# Patient Record
Sex: MALE | Race: WHITE | Employment: UNEMPLOYED | URBAN - METROPOLITAN AREA
[De-identification: names, ages, dates, MRNs, and addresses within clinical notes are randomized per-mention and may not be internally consistent; named-entity substitution may affect disease eponyms.]

---

## 2018-01-04 ENCOUNTER — HOSPITAL ENCOUNTER (EMERGENCY)
Age: 33
Discharge: HOME | End: 2018-01-04
Attending: EMERGENCY MEDICINE
Payer: COMMERCIAL

## 2018-01-04 VITALS
HEIGHT: 69 IN | RESPIRATION RATE: 17 BRPM | BODY MASS INDEX: 30.07 KG/M2 | SYSTOLIC BLOOD PRESSURE: 134 MMHG | TEMPERATURE: 97.9 F | WEIGHT: 203 LBS | HEART RATE: 67 BPM | OXYGEN SATURATION: 98 % | DIASTOLIC BLOOD PRESSURE: 90 MMHG

## 2018-01-04 DIAGNOSIS — N23 RENAL COLIC: Primary | ICD-10-CM

## 2018-01-04 LAB
-: ABNORMAL
AMORPHOUS: ABNORMAL
BACTERIA: ABNORMAL
BILIRUBIN URINE: NEGATIVE
CASTS UA: ABNORMAL /LPF
COLOR: YELLOW
COMMENT UA: ABNORMAL
CRYSTALS, UA: ABNORMAL /HPF
EPITHELIAL CELLS UA: ABNORMAL /HPF (ref 0–5)
GLUCOSE URINE: NEGATIVE
KETONES, URINE: NEGATIVE
LEUKOCYTE ESTERASE, URINE: NEGATIVE
MUCUS: ABNORMAL
NITRITE, URINE: NEGATIVE
OTHER OBSERVATIONS UA: ABNORMAL
PH UA: 6.5 (ref 5–8)
PROTEIN UA: ABNORMAL
RBC UA: ABNORMAL /HPF (ref 0–2)
RENAL EPITHELIAL, UA: ABNORMAL /HPF
SPECIFIC GRAVITY UA: 1.02 (ref 1–1.03)
TRICHOMONAS: ABNORMAL
TURBIDITY: ABNORMAL
URINE HGB: ABNORMAL
UROBILINOGEN, URINE: NORMAL
WBC UA: ABNORMAL /HPF (ref 0–5)
YEAST: ABNORMAL

## 2018-01-04 PROCEDURE — 99283 EMERGENCY DEPT VISIT LOW MDM: CPT

## 2018-01-04 PROCEDURE — 2580000003 HC RX 258: Performed by: EMERGENCY MEDICINE

## 2018-01-04 PROCEDURE — 96375 TX/PRO/DX INJ NEW DRUG ADDON: CPT

## 2018-01-04 PROCEDURE — 81001 URINALYSIS AUTO W/SCOPE: CPT

## 2018-01-04 PROCEDURE — 6360000002 HC RX W HCPCS: Performed by: EMERGENCY MEDICINE

## 2018-01-04 PROCEDURE — 96374 THER/PROPH/DIAG INJ IV PUSH: CPT

## 2018-01-04 RX ORDER — MORPHINE SULFATE 4 MG/ML
4 INJECTION, SOLUTION INTRAMUSCULAR; INTRAVENOUS ONCE
Status: COMPLETED | OUTPATIENT
Start: 2018-01-04 | End: 2018-01-04

## 2018-01-04 RX ORDER — ONDANSETRON 2 MG/ML
4 INJECTION INTRAMUSCULAR; INTRAVENOUS ONCE
Status: COMPLETED | OUTPATIENT
Start: 2018-01-04 | End: 2018-01-04

## 2018-01-04 RX ORDER — CITALOPRAM 10 MG/1
10 TABLET ORAL DAILY
COMMUNITY
Start: 2017-07-15

## 2018-01-04 RX ORDER — ALPRAZOLAM 0.25 MG/1
0.25 TABLET ORAL
COMMUNITY

## 2018-01-04 RX ORDER — GABAPENTIN 300 MG/1
CAPSULE ORAL
COMMUNITY
End: 2018-06-16

## 2018-01-04 RX ORDER — KETOROLAC TROMETHAMINE 15 MG/ML
15 INJECTION, SOLUTION INTRAMUSCULAR; INTRAVENOUS ONCE
Status: COMPLETED | OUTPATIENT
Start: 2018-01-04 | End: 2018-01-04

## 2018-01-04 RX ORDER — TOPIRAMATE 25 MG/1
TABLET ORAL
Status: ON HOLD | COMMUNITY
Start: 2017-09-13 | End: 2020-02-18

## 2018-01-04 RX ORDER — CYCLOBENZAPRINE HCL 10 MG
10 TABLET ORAL
Status: ON HOLD | COMMUNITY
End: 2020-02-18

## 2018-01-04 RX ORDER — OXYCODONE HYDROCHLORIDE AND ACETAMINOPHEN 5; 325 MG/1; MG/1
1 TABLET ORAL EVERY 6 HOURS PRN
Qty: 20 TABLET | Refills: 0 | Status: SHIPPED | OUTPATIENT
Start: 2018-01-04 | End: 2018-01-09

## 2018-01-04 RX ORDER — PRAVASTATIN SODIUM 40 MG
40 TABLET ORAL
COMMUNITY
Start: 2017-07-17

## 2018-01-04 RX ORDER — 0.9 % SODIUM CHLORIDE 0.9 %
500 INTRAVENOUS SOLUTION INTRAVENOUS ONCE
Status: COMPLETED | OUTPATIENT
Start: 2018-01-04 | End: 2018-01-04

## 2018-01-04 RX ADMIN — SODIUM CHLORIDE 500 ML: 9 INJECTION, SOLUTION INTRAVENOUS at 22:23

## 2018-01-04 RX ADMIN — KETOROLAC TROMETHAMINE 15 MG: 15 INJECTION, SOLUTION INTRAMUSCULAR; INTRAVENOUS at 22:23

## 2018-01-04 RX ADMIN — ONDANSETRON 4 MG: 2 INJECTION, SOLUTION INTRAMUSCULAR; INTRAVENOUS at 22:23

## 2018-01-04 RX ADMIN — MORPHINE SULFATE 4 MG: 4 INJECTION INTRAVENOUS at 22:26

## 2018-01-04 ASSESSMENT — PAIN DESCRIPTION - PAIN TYPE: TYPE: ACUTE PAIN

## 2018-01-04 ASSESSMENT — PAIN DESCRIPTION - LOCATION: LOCATION: BACK

## 2018-01-04 ASSESSMENT — PAIN SCALES - GENERAL
PAINLEVEL_OUTOF10: 2
PAINLEVEL_OUTOF10: 2
PAINLEVEL_OUTOF10: 1
PAINLEVEL_OUTOF10: 10

## 2018-01-04 ASSESSMENT — PAIN DESCRIPTION - FREQUENCY: FREQUENCY: INTERMITTENT

## 2018-01-04 ASSESSMENT — PAIN DESCRIPTION - DESCRIPTORS: DESCRIPTORS: SHARP

## 2018-01-04 ASSESSMENT — PAIN DESCRIPTION - ORIENTATION: ORIENTATION: RIGHT

## 2018-01-05 NOTE — ED NOTES
Pt cleared for discharge per MD. Pt discharge instructions explained. Prescription provided. Pt verbalizes understanding of all instructions and all patient questions answered to their satisfaction. Pt departs from ER ambulatory and in stable condition. Father driving pt home.      Isacc High RN  01/04/18 8843

## 2018-06-16 ENCOUNTER — HOSPITAL ENCOUNTER (EMERGENCY)
Age: 33
Discharge: HOME | End: 2018-06-16
Attending: EMERGENCY MEDICINE
Payer: COMMERCIAL

## 2018-06-16 VITALS
HEART RATE: 69 BPM | SYSTOLIC BLOOD PRESSURE: 128 MMHG | DIASTOLIC BLOOD PRESSURE: 79 MMHG | OXYGEN SATURATION: 97 % | TEMPERATURE: 98.8 F | RESPIRATION RATE: 16 BRPM

## 2018-06-16 DIAGNOSIS — R11.2 NAUSEA VOMITING AND DIARRHEA: Primary | ICD-10-CM

## 2018-06-16 DIAGNOSIS — R19.7 NAUSEA VOMITING AND DIARRHEA: Primary | ICD-10-CM

## 2018-06-16 LAB
-: ABNORMAL
ABSOLUTE EOS #: 0.1 K/UL (ref 0–0.4)
ABSOLUTE IMMATURE GRANULOCYTE: ABNORMAL K/UL (ref 0–0.3)
ABSOLUTE LYMPH #: 0.5 K/UL (ref 1–4.8)
ABSOLUTE MONO #: 0.5 K/UL (ref 0.1–1.2)
ALBUMIN SERPL-MCNC: 4.7 G/DL (ref 3.5–5.2)
ALBUMIN/GLOBULIN RATIO: 1.6 (ref 1–2.5)
ALP BLD-CCNC: 64 U/L (ref 40–129)
ALT SERPL-CCNC: 25 U/L (ref 5–41)
AMORPHOUS: ABNORMAL
AMYLASE: 60 U/L (ref 28–100)
ANION GAP SERPL CALCULATED.3IONS-SCNC: 16 MMOL/L (ref 9–17)
AST SERPL-CCNC: 21 U/L
BACTERIA: ABNORMAL
BASOPHILS # BLD: 0 % (ref 0–2)
BASOPHILS ABSOLUTE: 0 K/UL (ref 0–0.2)
BILIRUB SERPL-MCNC: 0.6 MG/DL (ref 0.3–1.2)
BILIRUBIN DIRECT: 0.15 MG/DL
BILIRUBIN URINE: NEGATIVE
BILIRUBIN, INDIRECT: 0.45 MG/DL (ref 0–1)
BUN BLDV-MCNC: 17 MG/DL (ref 6–20)
BUN/CREAT BLD: ABNORMAL (ref 9–20)
CALCIUM SERPL-MCNC: 9.6 MG/DL (ref 8.6–10.4)
CASTS UA: ABNORMAL /LPF
CHLORIDE BLD-SCNC: 103 MMOL/L (ref 98–107)
CO2: 22 MMOL/L (ref 20–31)
COLOR: YELLOW
COMMENT UA: ABNORMAL
CREAT SERPL-MCNC: 1.4 MG/DL (ref 0.7–1.2)
CRYSTALS, UA: ABNORMAL /HPF
DIFFERENTIAL TYPE: ABNORMAL
EOSINOPHILS RELATIVE PERCENT: 2 % (ref 1–4)
EPITHELIAL CELLS UA: ABNORMAL /HPF (ref 0–5)
GFR AFRICAN AMERICAN: >60 ML/MIN
GFR NON-AFRICAN AMERICAN: 58 ML/MIN
GFR SERPL CREATININE-BSD FRML MDRD: ABNORMAL ML/MIN/{1.73_M2}
GFR SERPL CREATININE-BSD FRML MDRD: ABNORMAL ML/MIN/{1.73_M2}
GLOBULIN: NORMAL G/DL (ref 1.5–3.8)
GLUCOSE BLD-MCNC: 97 MG/DL (ref 70–99)
GLUCOSE URINE: NEGATIVE
HCT VFR BLD CALC: 42.9 % (ref 41–53)
HEMOGLOBIN: 14.9 G/DL (ref 13.5–17.5)
IMMATURE GRANULOCYTES: ABNORMAL %
KETONES, URINE: NEGATIVE
LEUKOCYTE ESTERASE, URINE: NEGATIVE
LIPASE: 36 U/L (ref 13–60)
LYMPHOCYTES # BLD: 7 % (ref 24–44)
MCH RBC QN AUTO: 31.1 PG (ref 26–34)
MCHC RBC AUTO-ENTMCNC: 34.7 G/DL (ref 31–37)
MCV RBC AUTO: 89.7 FL (ref 80–100)
MONOCYTES # BLD: 6 % (ref 2–11)
MUCUS: ABNORMAL
NITRITE, URINE: NEGATIVE
NRBC AUTOMATED: ABNORMAL PER 100 WBC
OTHER OBSERVATIONS UA: ABNORMAL
PDW BLD-RTO: 12.9 % (ref 12.5–15.4)
PH UA: 6 (ref 5–8)
PLATELET # BLD: 216 K/UL (ref 140–450)
PLATELET ESTIMATE: ABNORMAL
PMV BLD AUTO: 9.5 FL (ref 6–12)
POTASSIUM SERPL-SCNC: 4 MMOL/L (ref 3.7–5.3)
PROTEIN UA: ABNORMAL
RBC # BLD: 4.79 M/UL (ref 4.5–5.9)
RBC # BLD: ABNORMAL 10*6/UL
RBC UA: ABNORMAL /HPF (ref 0–2)
RENAL EPITHELIAL, UA: ABNORMAL /HPF
SEG NEUTROPHILS: 85 % (ref 36–66)
SEGMENTED NEUTROPHILS ABSOLUTE COUNT: 6.4 K/UL (ref 1.8–7.7)
SODIUM BLD-SCNC: 141 MMOL/L (ref 135–144)
SPECIFIC GRAVITY UA: 1.03 (ref 1–1.03)
TOTAL PROTEIN: 7.6 G/DL (ref 6.4–8.3)
TRICHOMONAS: ABNORMAL
TURBIDITY: CLEAR
URINE HGB: NEGATIVE
UROBILINOGEN, URINE: NORMAL
WBC # BLD: 7.5 K/UL (ref 3.5–11)
WBC # BLD: ABNORMAL 10*3/UL
WBC UA: ABNORMAL /HPF (ref 0–5)
YEAST: ABNORMAL

## 2018-06-16 PROCEDURE — 81001 URINALYSIS AUTO W/SCOPE: CPT

## 2018-06-16 PROCEDURE — 83690 ASSAY OF LIPASE: CPT

## 2018-06-16 PROCEDURE — 99284 EMERGENCY DEPT VISIT MOD MDM: CPT

## 2018-06-16 PROCEDURE — 6360000002 HC RX W HCPCS: Performed by: EMERGENCY MEDICINE

## 2018-06-16 PROCEDURE — 82150 ASSAY OF AMYLASE: CPT

## 2018-06-16 PROCEDURE — 85025 COMPLETE CBC W/AUTO DIFF WBC: CPT

## 2018-06-16 PROCEDURE — 80076 HEPATIC FUNCTION PANEL: CPT

## 2018-06-16 PROCEDURE — 6370000000 HC RX 637 (ALT 250 FOR IP): Performed by: EMERGENCY MEDICINE

## 2018-06-16 PROCEDURE — 80048 BASIC METABOLIC PNL TOTAL CA: CPT

## 2018-06-16 PROCEDURE — 36415 COLL VENOUS BLD VENIPUNCTURE: CPT

## 2018-06-16 PROCEDURE — 96374 THER/PROPH/DIAG INJ IV PUSH: CPT

## 2018-06-16 PROCEDURE — 2580000003 HC RX 258: Performed by: EMERGENCY MEDICINE

## 2018-06-16 RX ORDER — ONDANSETRON 4 MG/1
4 TABLET, FILM COATED ORAL EVERY 8 HOURS PRN
Qty: 20 TABLET | Refills: 0 | Status: SHIPPED | OUTPATIENT
Start: 2018-06-16 | End: 2022-04-30 | Stop reason: ALTCHOICE

## 2018-06-16 RX ORDER — DICYCLOMINE HYDROCHLORIDE 10 MG/1
20 CAPSULE ORAL ONCE
Status: COMPLETED | OUTPATIENT
Start: 2018-06-16 | End: 2018-06-16

## 2018-06-16 RX ORDER — 0.9 % SODIUM CHLORIDE 0.9 %
1000 INTRAVENOUS SOLUTION INTRAVENOUS ONCE
Status: COMPLETED | OUTPATIENT
Start: 2018-06-16 | End: 2018-06-16

## 2018-06-16 RX ORDER — ONDANSETRON 2 MG/ML
4 INJECTION INTRAMUSCULAR; INTRAVENOUS ONCE
Status: COMPLETED | OUTPATIENT
Start: 2018-06-16 | End: 2018-06-16

## 2018-06-16 RX ORDER — DICYCLOMINE HYDROCHLORIDE 10 MG/1
10 CAPSULE ORAL EVERY 6 HOURS PRN
Qty: 20 CAPSULE | Refills: 0 | Status: ON HOLD | OUTPATIENT
Start: 2018-06-16 | End: 2020-02-18

## 2018-06-16 RX ADMIN — SODIUM CHLORIDE 1000 ML: 9 INJECTION, SOLUTION INTRAVENOUS at 12:13

## 2018-06-16 RX ADMIN — DICYCLOMINE HYDROCHLORIDE 10 MG: 10 CAPSULE ORAL at 12:16

## 2018-06-16 RX ADMIN — ONDANSETRON 4 MG: 2 INJECTION INTRAMUSCULAR; INTRAVENOUS at 12:15

## 2018-06-16 ASSESSMENT — PAIN DESCRIPTION - ONSET: ONSET: ON-GOING

## 2018-06-16 ASSESSMENT — PAIN DESCRIPTION - LOCATION: LOCATION: ABDOMEN

## 2018-06-16 ASSESSMENT — ENCOUNTER SYMPTOMS
NAUSEA: 1
DIARRHEA: 1
VOMITING: 1
ABDOMINAL PAIN: 1

## 2018-06-16 ASSESSMENT — PAIN DESCRIPTION - FREQUENCY: FREQUENCY: CONTINUOUS

## 2018-06-16 ASSESSMENT — PAIN SCALES - GENERAL: PAINLEVEL_OUTOF10: 5

## 2018-06-16 ASSESSMENT — PAIN DESCRIPTION - PROGRESSION: CLINICAL_PROGRESSION: NOT CHANGED

## 2018-06-16 ASSESSMENT — PAIN DESCRIPTION - DESCRIPTORS: DESCRIPTORS: CRAMPING

## 2018-06-16 ASSESSMENT — PAIN DESCRIPTION - PAIN TYPE: TYPE: ACUTE PAIN

## 2020-02-05 ENCOUNTER — HOSPITAL ENCOUNTER (OUTPATIENT)
Dept: PREADMISSION TESTING | Age: 35
Discharge: HOME | End: 2020-02-09
Payer: MEDICARE

## 2020-02-05 VITALS
BODY MASS INDEX: 28.88 KG/M2 | DIASTOLIC BLOOD PRESSURE: 90 MMHG | SYSTOLIC BLOOD PRESSURE: 139 MMHG | OXYGEN SATURATION: 97 % | RESPIRATION RATE: 16 BRPM | HEIGHT: 69 IN | HEART RATE: 73 BPM | WEIGHT: 195 LBS

## 2020-02-05 LAB
ANION GAP SERPL CALCULATED.3IONS-SCNC: 11 MMOL/L (ref 9–17)
BUN BLDV-MCNC: 16 MG/DL (ref 6–20)
BUN/CREAT BLD: 13 (ref 9–20)
CALCIUM SERPL-MCNC: 9.7 MG/DL (ref 8.6–10.4)
CHLORIDE BLD-SCNC: 103 MMOL/L (ref 98–107)
CO2: 26 MMOL/L (ref 20–31)
CREAT SERPL-MCNC: 1.23 MG/DL (ref 0.7–1.2)
GFR AFRICAN AMERICAN: >60 ML/MIN
GFR NON-AFRICAN AMERICAN: >60 ML/MIN
GFR SERPL CREATININE-BSD FRML MDRD: ABNORMAL ML/MIN/{1.73_M2}
GFR SERPL CREATININE-BSD FRML MDRD: ABNORMAL ML/MIN/{1.73_M2}
GLUCOSE BLD-MCNC: 85 MG/DL (ref 70–99)
HCT VFR BLD CALC: 42.7 % (ref 40.7–50.3)
HEMOGLOBIN: 14.1 G/DL (ref 13–17)
MCH RBC QN AUTO: 30.6 PG (ref 25.2–33.5)
MCHC RBC AUTO-ENTMCNC: 33 G/DL (ref 28–38)
MCV RBC AUTO: 92.6 FL (ref 82.6–102.9)
NRBC AUTOMATED: NORMAL PER 100 WBC
PDW BLD-RTO: 12.7 % (ref 11.8–14.4)
PLATELET # BLD: 268 K/UL (ref 138–453)
PMV BLD AUTO: 10.7 FL (ref 8.1–13.5)
POTASSIUM SERPL-SCNC: 3.9 MMOL/L (ref 3.7–5.3)
RBC # BLD: 4.61 M/UL (ref 4.21–5.77)
SODIUM BLD-SCNC: 140 MMOL/L (ref 135–144)
WBC # BLD: 5.8 K/UL (ref 3.5–11.3)

## 2020-02-05 PROCEDURE — 80048 BASIC METABOLIC PNL TOTAL CA: CPT

## 2020-02-05 PROCEDURE — 85027 COMPLETE CBC AUTOMATED: CPT

## 2020-02-05 PROCEDURE — 93005 ELECTROCARDIOGRAM TRACING: CPT | Performed by: ANESTHESIOLOGY

## 2020-02-05 PROCEDURE — 36415 COLL VENOUS BLD VENIPUNCTURE: CPT

## 2020-02-05 ASSESSMENT — PAIN DESCRIPTION - ORIENTATION: ORIENTATION: LEFT;UPPER

## 2020-02-05 ASSESSMENT — PAIN DESCRIPTION - PAIN TYPE: TYPE: ACUTE PAIN

## 2020-02-05 ASSESSMENT — PAIN DESCRIPTION - FREQUENCY: FREQUENCY: INTERMITTENT

## 2020-02-05 ASSESSMENT — PAIN DESCRIPTION - LOCATION: LOCATION: ARM

## 2020-02-05 ASSESSMENT — PAIN DESCRIPTION - PROGRESSION: CLINICAL_PROGRESSION: GRADUALLY IMPROVING

## 2020-02-05 ASSESSMENT — PAIN SCALES - GENERAL: PAINLEVEL_OUTOF10: 1

## 2020-02-05 ASSESSMENT — PAIN DESCRIPTION - DESCRIPTORS: DESCRIPTORS: DULL

## 2020-02-05 ASSESSMENT — PAIN - FUNCTIONAL ASSESSMENT: PAIN_FUNCTIONAL_ASSESSMENT: PREVENTS OR INTERFERES SOME ACTIVE ACTIVITIES AND ADLS

## 2020-02-05 NOTE — PRE-PROCEDURE INSTRUCTIONS
[facility] ON Tuesday, February 18 at 6:00 AM    Once you enter the hospital lobby, take the elevators to the second floor. Check-In is at the surgery registration desk. If you have been given a blood band, you must bring it with you the day of surgery. Continue to take your home medications as you normally do up to and including the night before surgery with the exception of any blood thinning medications. Please stop any blood thinning medications as directed by your surgeon or prescribing physician. Failure to stop certain medications may interfere with your scheduled surgery. These may include:  Aspirin, Warfarin (Coumadin), Clopidogrel (Plavix), Ibuprofen (Motrin, Advil), Naproxen (Aleve), Meloxicam (Mobic), Celecoxib (Celebrex), Eliquis, Pradaxa, Xarelto, Effient, Fish Oil, Herbal supplements. Stop aleve, fish oil 1 week prior to surgery    If you are diabetic, do not take any of your diabetic medications by mouth the morning of surgery. If you are taking insulin contact the doctor that manages your diabetes for instructions about any changes to your insulin dosages the day before surgery. Do not inject insulin or other injectable diabetic medications the morning of surgery unless otherwise instructed by the doctor who manages your diabetes. Please take the following medication(s) the day of surgery with a small sip of water:  Xanax if needed  Please use your inhalers at home the day of surgery. PREPARING FOR YOUR SURGERY:     Before surgery, you can play an important role in your own health. Because skin is not sterile, we need to be sure that your skin is as free of germs as possible before surgery by carefully washing before surgery. Preparing or prepping skin before surgery can reduce the risk of a surgical site infection.   Do not shave the area of your body where your surgery will be performed unless you received specific permission from your physician.     You will need to shower at home the night before surgery and the morning of surgery with a special soap called chlorhexidine gluconate (CHG*). *Not to be used by people allergic to Chlorhexidine Gluconate (CHG). Following these instructions will help you be sure that your skin is clean before surgery. Instructions on cleaning your skin before surgery: The night before your surgery:      You will need to shower with warm water (not hot) and the CHG soap.  Use a clean wash cloth and a clean towel. Have clean clothes available to put on after the shower.    First wash your hair with regular shampoo. Rinse your hair and body thoroughly to remove the shampoo. Otelia Tiffany Wash your face with your regular soap or water only. Thoroughly rinse your body with warm water from the neck down.  Turn water off to prevent rinsing the soap off too soon.  With a clean wet washcloth and half of the CHG soap in the bottle, lather your entire body from the neck down. Do not use CHG soap near your eyes or ears to avoid injury to those areas.  Wash thoroughly, paying special attention to the area where your surgery will be performed.  Wash your body gently for five (5) minutes. Avoid scrubbing your skin too hard.  Turn the water back on and rinse your body thoroughly.  Pat yourself dry with a clean, soft towel. Do not apply lotion, cream or powder.  Dress with clean freshly washed clothes. The morning of surgery:     Repeat shower following steps above - using remaining half of CHG soap in bottle. Patient Instructions:    Clem Allen If you are having any type of anesthesia you are to have nothing to eat or drink after midnight the night before your surgery. This includes gum, mints, water or smoking or chewing tobacco.  The only exception to this is a small sip of water to take with any morning dose of heart, blood pressure, or seizure medications.   No alcoholic beverages for 24 hours prior

## 2020-02-05 NOTE — H&P (VIEW-ONLY)
hours as needed for Nausea 6/16/18   Amarjit Retana MD   dicyclomine (BENTYL) 10 MG capsule Take 1 capsule by mouth every 6 hours as needed (cramps) 6/16/18   Amarjit Retana MD   cyclobenzaprine (FLEXERIL) 10 MG tablet Take 10 mg by mouth    Historical Provider, MD   ALPRAZolam [place]) 0.25 MG tablet Take 0.25 mg by mouth. Historical Provider, MD   citalopram (CELEXA) 10 MG tablet Take 10 mg by mouth daily  7/15/17   Historical Provider, MD   Omega-3 Fatty Acids (FISH OIL PO)  6/30/17   Historical Provider, MD   pravastatin (PRAVACHOL) 40 MG tablet Take 40 mg by mouth 7/17/17   Historical Provider, MD   topiramate (TOPAMAX) 25 MG tablet Take one daily, increase as directed. 9/13/17   Historical Provider, MD   OMEPRAZOLE PO Take by mouth    Historical Provider, MD   Cyanocobalamin (VITAMIN B 12 PO) Take by mouth    Historical Provider, MD   magnesium 30 MG tablet Take by mouth    Historical Provider, MD        Allergies:     Patient has no known allergies. Social History:     Tobacco:    reports that he has quit smoking. His smoking use included cigarettes. He has a 5.00 pack-year smoking history. He has never used smokeless tobacco.  Alcohol:      reports current alcohol use. Drug Use:  reports current drug use. Drug: Marijuana. Family History:     MOTHER HAD BREAST CANCER ,SURVIVES, DAD HAD SKIN AND THEN COLON CANCER, AND SURVIVES. Review of Systems:     Positive and Negative as described in HPI. CONSTITUTIONAL:  negative for fevers, chills,HAS  sweats, fatigue, weight loss  HEENT:  negative for vision, hearing changes, runny nose, throat pain  RESPIRATORY:  negative for shortness of breath, cough, congestion, wheezing. SLEEP APNEA IS SUSPECTED   CARDIOVASCULAR:  negative for chest pain, palpitations.   GASTROINTESTINAL:  negative for nausea, vomiting, HAS GERD TAKES OMEPRAZOLE NO diarrhea, constipation, change in bowel habits, abdominal pain   GENITOURINARY:  negative for difficulty of

## 2020-02-05 NOTE — H&P
hours as needed for Nausea 6/16/18   Minus MD Brayan   dicyclomine (BENTYL) 10 MG capsule Take 1 capsule by mouth every 6 hours as needed (cramps) 6/16/18   Minus MD Brayan   cyclobenzaprine (FLEXERIL) 10 MG tablet Take 10 mg by mouth    Historical Provider, MD   ALPRAZolam [place]) 0.25 MG tablet Take 0.25 mg by mouth. Historical Provider, MD   citalopram (CELEXA) 10 MG tablet Take 10 mg by mouth daily  7/15/17   Historical Provider, MD   Omega-3 Fatty Acids (FISH OIL PO)  6/30/17   Historical Provider, MD   pravastatin (PRAVACHOL) 40 MG tablet Take 40 mg by mouth 7/17/17   Historical Provider, MD   topiramate (TOPAMAX) 25 MG tablet Take one daily, increase as directed. 9/13/17   Historical Provider, MD   OMEPRAZOLE PO Take by mouth    Historical Provider, MD   Cyanocobalamin (VITAMIN B 12 PO) Take by mouth    Historical Provider, MD   magnesium 30 MG tablet Take by mouth    Historical Provider, MD        Allergies:     Patient has no known allergies. Social History:     Tobacco:    reports that he has quit smoking. His smoking use included cigarettes. He has a 5.00 pack-year smoking history. He has never used smokeless tobacco.  Alcohol:      reports current alcohol use. Drug Use:  reports current drug use. Drug: Marijuana. Family History:     MOTHER HAD BREAST CANCER ,SURVIVES, DAD HAD SKIN AND THEN COLON CANCER, AND SURVIVES. Review of Systems:     Positive and Negative as described in HPI. CONSTITUTIONAL:  negative for fevers, chills,HAS  sweats, fatigue, weight loss  HEENT:  negative for vision, hearing changes, runny nose, throat pain  RESPIRATORY:  negative for shortness of breath, cough, congestion, wheezing. SLEEP APNEA IS SUSPECTED   CARDIOVASCULAR:  negative for chest pain, palpitations.   GASTROINTESTINAL:  negative for nausea, vomiting, HAS GERD TAKES OMEPRAZOLE NO diarrhea, constipation, change in bowel habits, abdominal pain   GENITOURINARY:  negative for difficulty of normal affect     Investigations:      Laboratory Testing:  Recent Results (from the past 24 hour(s))   EKG 12 Lead    Collection Time: 02/05/20  1:20 PM   Result Value Ref Range    Ventricular Rate 77 BPM    Atrial Rate 77 BPM    P-R Interval 178 ms    QRS Duration 94 ms    Q-T Interval 392 ms    QTc Calculation (Bazett) 443 ms    P Axis 52 degrees    R Axis 55 degrees    T Axis 27 degrees       No results for input(s): HGB, HCT, WBC, MCV, PLATELET, NA, K, CL, CO2, BUN, CREATININE, GLUCOSE, INR, PROTIME, APTT, AST, ALT, LABALBU, HCG in the last 720 hours. Imaging/Diagnostics:    EKG ON THE SHORT CHART    Diagnosis:      1. LEFT HUMERUS FRACTURE     Plans:     1.  OPEN REDUCTION WITH INTERMAL FIXATION OF THE LET HUMERUS WITH ILIAC BONE GRAFT      ROSA Farley CNP  2/5/2020  1:52 PM

## 2020-02-07 LAB
EKG ATRIAL RATE: 77 BPM
EKG P AXIS: 52 DEGREES
EKG P-R INTERVAL: 178 MS
EKG Q-T INTERVAL: 392 MS
EKG QRS DURATION: 94 MS
EKG QTC CALCULATION (BAZETT): 443 MS
EKG R AXIS: 55 DEGREES
EKG T AXIS: 27 DEGREES
EKG VENTRICULAR RATE: 77 BPM

## 2020-02-07 PROCEDURE — 93010 ELECTROCARDIOGRAM REPORT: CPT | Performed by: INTERNAL MEDICINE

## 2020-02-17 ENCOUNTER — ANESTHESIA EVENT (OUTPATIENT)
Dept: OPERATING ROOM | Age: 35
End: 2020-02-17
Payer: MEDICARE

## 2020-02-18 ENCOUNTER — ANESTHESIA (OUTPATIENT)
Dept: OPERATING ROOM | Age: 35
End: 2020-02-18
Payer: MEDICARE

## 2020-02-18 ENCOUNTER — HOSPITAL ENCOUNTER (OUTPATIENT)
Age: 35
Setting detail: OBSERVATION
Discharge: HOME | End: 2020-02-19
Attending: ORTHOPAEDIC SURGERY | Admitting: ORTHOPAEDIC SURGERY
Payer: MEDICARE

## 2020-02-18 ENCOUNTER — APPOINTMENT (OUTPATIENT)
Dept: GENERAL RADIOLOGY | Age: 35
End: 2020-02-18
Attending: ORTHOPAEDIC SURGERY
Payer: MEDICARE

## 2020-02-18 VITALS — TEMPERATURE: 95.9 F | OXYGEN SATURATION: 95 % | SYSTOLIC BLOOD PRESSURE: 130 MMHG | DIASTOLIC BLOOD PRESSURE: 65 MMHG

## 2020-02-18 PROBLEM — S42.292K: Status: ACTIVE | Noted: 2020-02-18

## 2020-02-18 PROCEDURE — 3600000003 HC SURGERY LEVEL 3 BASE: Performed by: ORTHOPAEDIC SURGERY

## 2020-02-18 PROCEDURE — 2500000003 HC RX 250 WO HCPCS: Performed by: ORTHOPAEDIC SURGERY

## 2020-02-18 PROCEDURE — 7100000001 HC PACU RECOVERY - ADDTL 15 MIN: Performed by: ORTHOPAEDIC SURGERY

## 2020-02-18 PROCEDURE — G0378 HOSPITAL OBSERVATION PER HR: HCPCS

## 2020-02-18 PROCEDURE — 3209999900 FLUORO FOR SURGICAL PROCEDURES

## 2020-02-18 PROCEDURE — 2580000003 HC RX 258: Performed by: ORTHOPAEDIC SURGERY

## 2020-02-18 PROCEDURE — 3700000000 HC ANESTHESIA ATTENDED CARE: Performed by: ORTHOPAEDIC SURGERY

## 2020-02-18 PROCEDURE — 2720000010 HC SURG SUPPLY STERILE: Performed by: ORTHOPAEDIC SURGERY

## 2020-02-18 PROCEDURE — 3700000001 HC ADD 15 MINUTES (ANESTHESIA): Performed by: ORTHOPAEDIC SURGERY

## 2020-02-18 PROCEDURE — 3600000013 HC SURGERY LEVEL 3 ADDTL 15MIN: Performed by: ORTHOPAEDIC SURGERY

## 2020-02-18 PROCEDURE — C1713 ANCHOR/SCREW BN/BN,TIS/BN: HCPCS | Performed by: ORTHOPAEDIC SURGERY

## 2020-02-18 PROCEDURE — 73060 X-RAY EXAM OF HUMERUS: CPT

## 2020-02-18 PROCEDURE — 6360000002 HC RX W HCPCS: Performed by: ANESTHESIOLOGY

## 2020-02-18 PROCEDURE — C9290 INJ, BUPIVACAINE LIPOSOME: HCPCS | Performed by: ORTHOPAEDIC SURGERY

## 2020-02-18 PROCEDURE — 6370000000 HC RX 637 (ALT 250 FOR IP): Performed by: ORTHOPAEDIC SURGERY

## 2020-02-18 PROCEDURE — 6360000002 HC RX W HCPCS: Performed by: NURSE ANESTHETIST, CERTIFIED REGISTERED

## 2020-02-18 PROCEDURE — 7100000000 HC PACU RECOVERY - FIRST 15 MIN: Performed by: ORTHOPAEDIC SURGERY

## 2020-02-18 PROCEDURE — 6360000002 HC RX W HCPCS: Performed by: ORTHOPAEDIC SURGERY

## 2020-02-18 PROCEDURE — 73030 X-RAY EXAM OF SHOULDER: CPT

## 2020-02-18 PROCEDURE — 2580000003 HC RX 258: Performed by: ANESTHESIOLOGY

## 2020-02-18 PROCEDURE — 2709999900 HC NON-CHARGEABLE SUPPLY: Performed by: ORTHOPAEDIC SURGERY

## 2020-02-18 PROCEDURE — 2500000003 HC RX 250 WO HCPCS: Performed by: NURSE ANESTHETIST, CERTIFIED REGISTERED

## 2020-02-18 DEVICE — GRAFT BONE SUB 5CC L1-10MM CANC CRUSH FRZ DRY: Type: IMPLANTABLE DEVICE | Site: ARM | Status: FUNCTIONAL

## 2020-02-18 DEVICE — SCREW BNE L38MM DIA4.5MM HUM G TI SELF CUT FULL THRD BLNT: Type: IMPLANTABLE DEVICE | Site: ARM | Status: FUNCTIONAL

## 2020-02-18 DEVICE — SCREW BNE L28MM DIA4MM HUM BLU TI ST CANN LOK FULL THRD T25: Type: IMPLANTABLE DEVICE | Site: ARM | Status: FUNCTIONAL

## 2020-02-18 DEVICE — SCREW BNE L42MM DIA4.5MM HUM G TI SELF CUT FULL THRD BLNT: Type: IMPLANTABLE DEVICE | Site: ARM | Status: FUNCTIONAL

## 2020-02-18 DEVICE — IMPLANTABLE DEVICE: Type: IMPLANTABLE DEVICE | Site: ARM | Status: FUNCTIONAL

## 2020-02-18 DEVICE — SCREW BNE L30MM DIA4MM HUM BLU TI ST CANN LOK FULL THRD T25: Type: IMPLANTABLE DEVICE | Site: ARM | Status: FUNCTIONAL

## 2020-02-18 RX ORDER — OXYCODONE HYDROCHLORIDE AND ACETAMINOPHEN 5; 325 MG/1; MG/1
1-2 TABLET ORAL EVERY 4 HOURS PRN
Qty: 50 TABLET | Refills: 0 | Status: SHIPPED | OUTPATIENT
Start: 2020-02-18 | End: 2020-02-25

## 2020-02-18 RX ORDER — ROCURONIUM BROMIDE 10 MG/ML
INJECTION, SOLUTION INTRAVENOUS PRN
Status: DISCONTINUED | OUTPATIENT
Start: 2020-02-18 | End: 2020-02-18 | Stop reason: SDUPTHER

## 2020-02-18 RX ORDER — PROPOFOL 10 MG/ML
INJECTION, EMULSION INTRAVENOUS PRN
Status: DISCONTINUED | OUTPATIENT
Start: 2020-02-18 | End: 2020-02-18 | Stop reason: SDUPTHER

## 2020-02-18 RX ORDER — OXYCODONE HYDROCHLORIDE 5 MG/1
10 TABLET ORAL EVERY 4 HOURS PRN
Status: DISCONTINUED | OUTPATIENT
Start: 2020-02-18 | End: 2020-02-19 | Stop reason: HOSPADM

## 2020-02-18 RX ORDER — ALPRAZOLAM 0.25 MG/1
0.25 TABLET ORAL 2 TIMES DAILY PRN
Status: DISCONTINUED | OUTPATIENT
Start: 2020-02-18 | End: 2020-02-19 | Stop reason: HOSPADM

## 2020-02-18 RX ORDER — SODIUM CHLORIDE 0.9 % (FLUSH) 0.9 %
10 SYRINGE (ML) INJECTION EVERY 12 HOURS SCHEDULED
Status: DISCONTINUED | OUTPATIENT
Start: 2020-02-18 | End: 2020-02-19 | Stop reason: HOSPADM

## 2020-02-18 RX ORDER — SODIUM CHLORIDE 0.9 % (FLUSH) 0.9 %
10 SYRINGE (ML) INJECTION PRN
Status: DISCONTINUED | OUTPATIENT
Start: 2020-02-18 | End: 2020-02-19 | Stop reason: HOSPADM

## 2020-02-18 RX ORDER — MIDAZOLAM HYDROCHLORIDE 1 MG/ML
INJECTION INTRAMUSCULAR; INTRAVENOUS PRN
Status: DISCONTINUED | OUTPATIENT
Start: 2020-02-18 | End: 2020-02-18 | Stop reason: SDUPTHER

## 2020-02-18 RX ORDER — BISACODYL 10 MG
10 SUPPOSITORY, RECTAL RECTAL DAILY PRN
Status: DISCONTINUED | OUTPATIENT
Start: 2020-02-18 | End: 2020-02-19 | Stop reason: HOSPADM

## 2020-02-18 RX ORDER — LIDOCAINE HYDROCHLORIDE 10 MG/ML
1 INJECTION, SOLUTION EPIDURAL; INFILTRATION; INTRACAUDAL; PERINEURAL
Status: DISCONTINUED | OUTPATIENT
Start: 2020-02-18 | End: 2020-02-18 | Stop reason: HOSPADM

## 2020-02-18 RX ORDER — ONDANSETRON 2 MG/ML
4 INJECTION INTRAMUSCULAR; INTRAVENOUS
Status: DISCONTINUED | OUTPATIENT
Start: 2020-02-18 | End: 2020-02-18 | Stop reason: HOSPADM

## 2020-02-18 RX ORDER — ONDANSETRON 2 MG/ML
4 INJECTION INTRAMUSCULAR; INTRAVENOUS EVERY 6 HOURS PRN
Status: DISCONTINUED | OUTPATIENT
Start: 2020-02-18 | End: 2020-02-19 | Stop reason: HOSPADM

## 2020-02-18 RX ORDER — SENNA AND DOCUSATE SODIUM 50; 8.6 MG/1; MG/1
1 TABLET, FILM COATED ORAL 2 TIMES DAILY
Status: DISCONTINUED | OUTPATIENT
Start: 2020-02-18 | End: 2020-02-19 | Stop reason: HOSPADM

## 2020-02-18 RX ORDER — ACETAMINOPHEN 160 MG
2000 TABLET,DISINTEGRATING ORAL DAILY
COMMUNITY

## 2020-02-18 RX ORDER — ONDANSETRON 4 MG/1
4 TABLET, ORALLY DISINTEGRATING ORAL EVERY 6 HOURS PRN
Status: DISCONTINUED | OUTPATIENT
Start: 2020-02-18 | End: 2020-02-19 | Stop reason: HOSPADM

## 2020-02-18 RX ORDER — OXYCODONE HYDROCHLORIDE 5 MG/1
5 TABLET ORAL EVERY 4 HOURS PRN
Status: DISCONTINUED | OUTPATIENT
Start: 2020-02-18 | End: 2020-02-19 | Stop reason: HOSPADM

## 2020-02-18 RX ORDER — VITAMIN B COMPLEX
2000 TABLET ORAL DAILY
Status: DISCONTINUED | OUTPATIENT
Start: 2020-02-18 | End: 2020-02-19 | Stop reason: HOSPADM

## 2020-02-18 RX ORDER — ONDANSETRON 2 MG/ML
INJECTION INTRAMUSCULAR; INTRAVENOUS PRN
Status: DISCONTINUED | OUTPATIENT
Start: 2020-02-18 | End: 2020-02-18 | Stop reason: SDUPTHER

## 2020-02-18 RX ORDER — CITALOPRAM 10 MG/1
10 TABLET ORAL DAILY
Status: DISCONTINUED | OUTPATIENT
Start: 2020-02-18 | End: 2020-02-19 | Stop reason: HOSPADM

## 2020-02-18 RX ORDER — FENOFIBRATE 160 MG/1
160 TABLET ORAL DAILY
COMMUNITY

## 2020-02-18 RX ORDER — CHLORTHALIDONE 25 MG/1
25 TABLET ORAL DAILY
COMMUNITY
End: 2022-09-18

## 2020-02-18 RX ORDER — ESMOLOL HYDROCHLORIDE 10 MG/ML
INJECTION INTRAVENOUS PRN
Status: DISCONTINUED | OUTPATIENT
Start: 2020-02-18 | End: 2020-02-18 | Stop reason: SDUPTHER

## 2020-02-18 RX ORDER — SODIUM CHLORIDE 9 MG/ML
INJECTION, SOLUTION INTRAVENOUS CONTINUOUS
Status: DISCONTINUED | OUTPATIENT
Start: 2020-02-18 | End: 2020-02-18

## 2020-02-18 RX ORDER — DOCUSATE SODIUM 100 MG/1
100 CAPSULE, LIQUID FILLED ORAL 2 TIMES DAILY
Qty: 30 CAPSULE | Refills: 0 | Status: SHIPPED | OUTPATIENT
Start: 2020-02-18 | End: 2020-09-08

## 2020-02-18 RX ORDER — LIDOCAINE HYDROCHLORIDE 20 MG/ML
INJECTION, SOLUTION EPIDURAL; INFILTRATION; INTRACAUDAL; PERINEURAL PRN
Status: DISCONTINUED | OUTPATIENT
Start: 2020-02-18 | End: 2020-02-18 | Stop reason: SDUPTHER

## 2020-02-18 RX ORDER — SODIUM CHLORIDE 0.9 % (FLUSH) 0.9 %
10 SYRINGE (ML) INJECTION PRN
Status: DISCONTINUED | OUTPATIENT
Start: 2020-02-18 | End: 2020-02-18 | Stop reason: HOSPADM

## 2020-02-18 RX ORDER — MAGNESIUM 30 MG
30 TABLET ORAL DAILY
Status: DISCONTINUED | OUTPATIENT
Start: 2020-02-18 | End: 2020-02-18 | Stop reason: RX

## 2020-02-18 RX ORDER — HYDROMORPHONE HCL 110MG/55ML
0.25 PATIENT CONTROLLED ANALGESIA SYRINGE INTRAVENOUS EVERY 5 MIN PRN
Status: DISCONTINUED | OUTPATIENT
Start: 2020-02-18 | End: 2020-02-18 | Stop reason: HOSPADM

## 2020-02-18 RX ORDER — SODIUM CHLORIDE, SODIUM LACTATE, POTASSIUM CHLORIDE, CALCIUM CHLORIDE 600; 310; 30; 20 MG/100ML; MG/100ML; MG/100ML; MG/100ML
INJECTION, SOLUTION INTRAVENOUS CONTINUOUS
Status: DISCONTINUED | OUTPATIENT
Start: 2020-02-18 | End: 2020-02-18

## 2020-02-18 RX ORDER — ONDANSETRON 2 MG/ML
4 INJECTION INTRAMUSCULAR; INTRAVENOUS EVERY 6 HOURS PRN
Status: DISCONTINUED | OUTPATIENT
Start: 2020-02-18 | End: 2020-02-18

## 2020-02-18 RX ORDER — FENOFIBRATE 160 MG/1
160 TABLET ORAL DAILY
Status: DISCONTINUED | OUTPATIENT
Start: 2020-02-18 | End: 2020-02-19 | Stop reason: HOSPADM

## 2020-02-18 RX ORDER — DEXAMETHASONE SODIUM PHOSPHATE 10 MG/ML
INJECTION INTRAMUSCULAR; INTRAVENOUS PRN
Status: DISCONTINUED | OUTPATIENT
Start: 2020-02-18 | End: 2020-02-18 | Stop reason: SDUPTHER

## 2020-02-18 RX ORDER — ACETAMINOPHEN 325 MG/1
650 TABLET ORAL EVERY 6 HOURS
Status: DISCONTINUED | OUTPATIENT
Start: 2020-02-18 | End: 2020-02-19 | Stop reason: HOSPADM

## 2020-02-18 RX ORDER — SODIUM CHLORIDE 0.9 % (FLUSH) 0.9 %
10 SYRINGE (ML) INJECTION EVERY 12 HOURS SCHEDULED
Status: DISCONTINUED | OUTPATIENT
Start: 2020-02-18 | End: 2020-02-18 | Stop reason: HOSPADM

## 2020-02-18 RX ORDER — DOCUSATE SODIUM 100 MG/1
100 CAPSULE, LIQUID FILLED ORAL 2 TIMES DAILY
Status: DISCONTINUED | OUTPATIENT
Start: 2020-02-18 | End: 2020-02-19 | Stop reason: HOSPADM

## 2020-02-18 RX ORDER — PRAVASTATIN SODIUM 40 MG
40 TABLET ORAL NIGHTLY
Status: DISCONTINUED | OUTPATIENT
Start: 2020-02-18 | End: 2020-02-19 | Stop reason: HOSPADM

## 2020-02-18 RX ORDER — ASPIRIN 81 MG/1
81 TABLET ORAL 2 TIMES DAILY
Qty: 28 TABLET | Refills: 0 | Status: SHIPPED | OUTPATIENT
Start: 2020-02-18 | End: 2022-04-30 | Stop reason: ALTCHOICE

## 2020-02-18 RX ORDER — HYDROMORPHONE HCL 110MG/55ML
0.5 PATIENT CONTROLLED ANALGESIA SYRINGE INTRAVENOUS EVERY 5 MIN PRN
Status: DISCONTINUED | OUTPATIENT
Start: 2020-02-18 | End: 2020-02-18 | Stop reason: HOSPADM

## 2020-02-18 RX ORDER — FENTANYL CITRATE 50 UG/ML
25 INJECTION, SOLUTION INTRAMUSCULAR; INTRAVENOUS EVERY 5 MIN PRN
Status: DISCONTINUED | OUTPATIENT
Start: 2020-02-18 | End: 2020-02-18 | Stop reason: HOSPADM

## 2020-02-18 RX ORDER — FENTANYL CITRATE 50 UG/ML
INJECTION, SOLUTION INTRAMUSCULAR; INTRAVENOUS PRN
Status: DISCONTINUED | OUTPATIENT
Start: 2020-02-18 | End: 2020-02-18 | Stop reason: SDUPTHER

## 2020-02-18 RX ORDER — SODIUM CHLORIDE 9 MG/ML
INJECTION, SOLUTION INTRAVENOUS CONTINUOUS
Status: DISCONTINUED | OUTPATIENT
Start: 2020-02-18 | End: 2020-02-19 | Stop reason: HOSPADM

## 2020-02-18 RX ORDER — FENTANYL CITRATE 50 UG/ML
50 INJECTION, SOLUTION INTRAMUSCULAR; INTRAVENOUS EVERY 5 MIN PRN
Status: DISCONTINUED | OUTPATIENT
Start: 2020-02-18 | End: 2020-02-18 | Stop reason: HOSPADM

## 2020-02-18 RX ORDER — CHLORTHALIDONE 25 MG/1
25 TABLET ORAL DAILY
Status: DISCONTINUED | OUTPATIENT
Start: 2020-02-18 | End: 2020-02-19 | Stop reason: HOSPADM

## 2020-02-18 RX ADMIN — OXYCODONE HYDROCHLORIDE 10 MG: 5 TABLET ORAL at 16:53

## 2020-02-18 RX ADMIN — OXYCODONE HYDROCHLORIDE 10 MG: 5 TABLET ORAL at 12:42

## 2020-02-18 RX ADMIN — FENTANYL CITRATE 50 MCG: 50 INJECTION, SOLUTION INTRAMUSCULAR; INTRAVENOUS at 11:20

## 2020-02-18 RX ADMIN — ACETAMINOPHEN 650 MG: 325 TABLET ORAL at 18:27

## 2020-02-18 RX ADMIN — ONDANSETRON 4 MG: 2 INJECTION, SOLUTION INTRAMUSCULAR; INTRAVENOUS at 08:15

## 2020-02-18 RX ADMIN — LIDOCAINE HYDROCHLORIDE 50 MG: 20 INJECTION, SOLUTION EPIDURAL; INFILTRATION; INTRACAUDAL; PERINEURAL at 07:59

## 2020-02-18 RX ADMIN — FENOFIBRATE 160 MG: 160 TABLET ORAL at 12:45

## 2020-02-18 RX ADMIN — SENNOSIDES AND DOCUSATE SODIUM 1 TABLET: 8.6; 5 TABLET ORAL at 21:12

## 2020-02-18 RX ADMIN — MIDAZOLAM 2 MG: 1 INJECTION INTRAMUSCULAR; INTRAVENOUS at 07:59

## 2020-02-18 RX ADMIN — ESMOLOL HYDROCHLORIDE 20 MG: 10 INJECTION, SOLUTION INTRAVENOUS at 08:50

## 2020-02-18 RX ADMIN — PROPOFOL 250 MG: 10 INJECTION, EMULSION INTRAVENOUS at 07:59

## 2020-02-18 RX ADMIN — FENTANYL CITRATE 50 MCG: 50 INJECTION, SOLUTION INTRAMUSCULAR; INTRAVENOUS at 11:00

## 2020-02-18 RX ADMIN — ESMOLOL HYDROCHLORIDE 20 MG: 10 INJECTION, SOLUTION INTRAVENOUS at 08:55

## 2020-02-18 RX ADMIN — OXYCODONE HYDROCHLORIDE 10 MG: 5 TABLET ORAL at 21:12

## 2020-02-18 RX ADMIN — Medication 100 MCG: at 08:55

## 2020-02-18 RX ADMIN — Medication 100 MCG: at 07:59

## 2020-02-18 RX ADMIN — CEFAZOLIN 2 G: 10 INJECTION, POWDER, FOR SOLUTION INTRAVENOUS at 21:12

## 2020-02-18 RX ADMIN — ESMOLOL HYDROCHLORIDE 20 MG: 10 INJECTION, SOLUTION INTRAVENOUS at 08:45

## 2020-02-18 RX ADMIN — CHLORTHALIDONE 25 MG: 25 TABLET ORAL at 12:41

## 2020-02-18 RX ADMIN — SODIUM CHLORIDE, POTASSIUM CHLORIDE, SODIUM LACTATE AND CALCIUM CHLORIDE: 600; 310; 30; 20 INJECTION, SOLUTION INTRAVENOUS at 06:19

## 2020-02-18 RX ADMIN — DEXAMETHASONE SODIUM PHOSPHATE 10 MG: 10 INJECTION INTRAMUSCULAR; INTRAVENOUS at 08:15

## 2020-02-18 RX ADMIN — CEFAZOLIN 2 G: 1 INJECTION, POWDER, FOR SOLUTION INTRAMUSCULAR; INTRAVENOUS at 07:55

## 2020-02-18 RX ADMIN — DOCUSATE SODIUM 100 MG: 100 CAPSULE, LIQUID FILLED ORAL at 21:12

## 2020-02-18 RX ADMIN — CITALOPRAM 10 MG: 10 TABLET, FILM COATED ORAL at 12:42

## 2020-02-18 RX ADMIN — ROCURONIUM BROMIDE 50 MG: 10 INJECTION, SOLUTION INTRAVENOUS at 07:59

## 2020-02-18 RX ADMIN — PRAVASTATIN SODIUM 40 MG: 40 TABLET ORAL at 21:12

## 2020-02-18 RX ADMIN — CEFAZOLIN 2 G: 10 INJECTION, POWDER, FOR SOLUTION INTRAVENOUS at 13:18

## 2020-02-18 RX ADMIN — ACETAMINOPHEN 650 MG: 325 TABLET ORAL at 12:40

## 2020-02-18 RX ADMIN — SODIUM CHLORIDE: 9 INJECTION, SOLUTION INTRAVENOUS at 12:42

## 2020-02-18 RX ADMIN — MELATONIN 2000 UNITS: at 12:41

## 2020-02-18 RX ADMIN — DOCUSATE SODIUM 100 MG: 100 CAPSULE, LIQUID FILLED ORAL at 12:45

## 2020-02-18 ASSESSMENT — PULMONARY FUNCTION TESTS
PIF_VALUE: 25
PIF_VALUE: 24
PIF_VALUE: 26
PIF_VALUE: 4
PIF_VALUE: 23
PIF_VALUE: 25
PIF_VALUE: 24
PIF_VALUE: 2
PIF_VALUE: 26
PIF_VALUE: 25
PIF_VALUE: 22
PIF_VALUE: 26
PIF_VALUE: 21
PIF_VALUE: 25
PIF_VALUE: 2
PIF_VALUE: 25
PIF_VALUE: 22
PIF_VALUE: 25
PIF_VALUE: 21
PIF_VALUE: 19
PIF_VALUE: 26
PIF_VALUE: 26
PIF_VALUE: 25
PIF_VALUE: 24
PIF_VALUE: 1
PIF_VALUE: 22
PIF_VALUE: 24
PIF_VALUE: 0
PIF_VALUE: 26
PIF_VALUE: 23
PIF_VALUE: 24
PIF_VALUE: 2
PIF_VALUE: 2
PIF_VALUE: 25
PIF_VALUE: 24
PIF_VALUE: 25
PIF_VALUE: 2
PIF_VALUE: 25
PIF_VALUE: 2
PIF_VALUE: 27
PIF_VALUE: 2
PIF_VALUE: 24
PIF_VALUE: 24
PIF_VALUE: 21
PIF_VALUE: 26
PIF_VALUE: 0
PIF_VALUE: 27
PIF_VALUE: 2
PIF_VALUE: 25
PIF_VALUE: 24
PIF_VALUE: 26
PIF_VALUE: 25
PIF_VALUE: 2
PIF_VALUE: 24
PIF_VALUE: 25
PIF_VALUE: 23
PIF_VALUE: 0
PIF_VALUE: 0
PIF_VALUE: 24
PIF_VALUE: 25
PIF_VALUE: 26
PIF_VALUE: 25
PIF_VALUE: 24
PIF_VALUE: 24
PIF_VALUE: 0
PIF_VALUE: 24
PIF_VALUE: 25
PIF_VALUE: 25
PIF_VALUE: 22
PIF_VALUE: 25
PIF_VALUE: 24
PIF_VALUE: 26
PIF_VALUE: 34
PIF_VALUE: 24
PIF_VALUE: 2
PIF_VALUE: 27
PIF_VALUE: 25
PIF_VALUE: 24
PIF_VALUE: 45
PIF_VALUE: 24
PIF_VALUE: 0
PIF_VALUE: 25
PIF_VALUE: 26
PIF_VALUE: 32
PIF_VALUE: 1
PIF_VALUE: 24
PIF_VALUE: 25
PIF_VALUE: 24
PIF_VALUE: 35
PIF_VALUE: 26
PIF_VALUE: 31
PIF_VALUE: 2
PIF_VALUE: 26
PIF_VALUE: 12
PIF_VALUE: 25
PIF_VALUE: 2
PIF_VALUE: 23
PIF_VALUE: 24
PIF_VALUE: 26
PIF_VALUE: 26
PIF_VALUE: 2
PIF_VALUE: 26
PIF_VALUE: 26
PIF_VALUE: 27
PIF_VALUE: 22
PIF_VALUE: 24
PIF_VALUE: 25
PIF_VALUE: 29
PIF_VALUE: 0
PIF_VALUE: 25
PIF_VALUE: 2
PIF_VALUE: 24
PIF_VALUE: 24
PIF_VALUE: 22
PIF_VALUE: 28
PIF_VALUE: 25
PIF_VALUE: 24
PIF_VALUE: 26
PIF_VALUE: 23
PIF_VALUE: 25
PIF_VALUE: 23
PIF_VALUE: 24
PIF_VALUE: 24
PIF_VALUE: 26
PIF_VALUE: 26
PIF_VALUE: 24
PIF_VALUE: 25
PIF_VALUE: 2
PIF_VALUE: 25
PIF_VALUE: 26
PIF_VALUE: 25
PIF_VALUE: 25
PIF_VALUE: 1
PIF_VALUE: 25
PIF_VALUE: 22
PIF_VALUE: 0
PIF_VALUE: 26
PIF_VALUE: 22
PIF_VALUE: 1
PIF_VALUE: 24
PIF_VALUE: 26
PIF_VALUE: 0
PIF_VALUE: 25
PIF_VALUE: 25
PIF_VALUE: 24
PIF_VALUE: 2
PIF_VALUE: 24
PIF_VALUE: 23
PIF_VALUE: 2
PIF_VALUE: 21
PIF_VALUE: 24

## 2020-02-18 ASSESSMENT — PAIN DESCRIPTION - PROGRESSION
CLINICAL_PROGRESSION: GRADUALLY IMPROVING
CLINICAL_PROGRESSION: GRADUALLY IMPROVING

## 2020-02-18 ASSESSMENT — PAIN DESCRIPTION - DESCRIPTORS
DESCRIPTORS: SHARP
DESCRIPTORS: ACHING;DISCOMFORT

## 2020-02-18 ASSESSMENT — PAIN SCALES - GENERAL
PAINLEVEL_OUTOF10: 7
PAINLEVEL_OUTOF10: 3
PAINLEVEL_OUTOF10: 4
PAINLEVEL_OUTOF10: 7
PAINLEVEL_OUTOF10: 0
PAINLEVEL_OUTOF10: 4
PAINLEVEL_OUTOF10: 4
PAINLEVEL_OUTOF10: 2
PAINLEVEL_OUTOF10: 7
PAINLEVEL_OUTOF10: 7
PAINLEVEL_OUTOF10: 9

## 2020-02-18 ASSESSMENT — PAIN DESCRIPTION - PAIN TYPE
TYPE: SURGICAL PAIN
TYPE: ACUTE PAIN

## 2020-02-18 ASSESSMENT — PAIN DESCRIPTION - ORIENTATION
ORIENTATION: LEFT

## 2020-02-18 ASSESSMENT — PAIN DESCRIPTION - ONSET: ONSET: ON-GOING

## 2020-02-18 ASSESSMENT — PAIN DESCRIPTION - LOCATION
LOCATION: SHOULDER

## 2020-02-18 ASSESSMENT — PAIN - FUNCTIONAL ASSESSMENT
PAIN_FUNCTIONAL_ASSESSMENT: PREVENTS OR INTERFERES SOME ACTIVE ACTIVITIES AND ADLS
PAIN_FUNCTIONAL_ASSESSMENT: 0-10

## 2020-02-18 ASSESSMENT — PAIN DESCRIPTION - FREQUENCY
FREQUENCY: CONTINUOUS
FREQUENCY: INTERMITTENT

## 2020-02-18 NOTE — ANESTHESIA PRE PROCEDURE
Department of Anesthesiology  Preprocedure Note       Name:  Gregory Nuñez   Age:  37 y.o.  :  1985                                          MRN:  [id number]         Date:  2020      Surgeon: Gigi Gold):  Chito Molina MD    Procedure: LEFT PROXIMAL HUMERUS ORIF  WITH ILIAC BONE GRAFT -SYNTHES PLATES, HANNY  EXPAREL (Left )    Medications prior to admission:   Prior to Admission medications    Medication Sig Start Date End Date Taking? Authorizing Provider   chlorthalidone (HYGROTON) 25 MG tablet Take 25 mg by mouth daily   Yes Historical Provider, MD   fenofibrate 160 MG tablet Take 160 mg by mouth daily   Yes Historical Provider, MD   Cholecalciferol (VITAMIN D3) 50 MCG (2000) CAPS Take 2,000 Units by mouth daily   Yes Historical Provider, MD   ALPRAZolam (XANAX) 0.25 MG tablet Take 0.25 mg by mouth.    Yes Historical Provider, MD   citalopram (CELEXA) 10 MG tablet Take 10 mg by mouth daily  7/15/17  Yes Historical Provider, MD   pravastatin (PRAVACHOL) 40 MG tablet Take 40 mg by mouth 17  Yes Historical Provider, MD   OMEPRAZOLE PO Take by mouth   Yes Historical Provider, MD   Cyanocobalamin (VITAMIN B 12 PO) Take by mouth   Yes Historical Provider, MD   magnesium 30 MG tablet Take by mouth   Yes Historical Provider, MD   ondansetron (ZOFRAN) 4 MG tablet Take 1 tablet by mouth every 8 hours as needed for Nausea 18   Patricia Mcnamara MD       Current medications:    Current Facility-Administered Medications   Medication Dose Route Frequency Provider Last Rate Last Dose    0.9 % sodium chloride infusion   Intravenous Continuous Moriah Parnell MD        lactated ringers infusion   Intravenous Continuous Dolly Barbosa  mL/hr at 20 0619      sodium chloride flush 0.9 % injection 10 mL  10 mL Intravenous 2 times per day Dolly Barbosa MD        sodium chloride flush 0.9 % injection 10 mL  10 mL Intravenous PRN Dolly Barbosa MD        lidocaine PF 1 %

## 2020-02-18 NOTE — FLOWSHEET NOTE
Patient + 2 parents were present. Patient states well. No major needs or prayers were expressed.  leaves prayer card for possible follow up.     02/18/20 1358   Encounter Summary   Services provided to: Patient and family together   Referral/Consult From: [facility] Parent   Continue Visiting   (2-18-20)   Complexity of Encounter Low   Length of Encounter 15 minutes   Spiritual Assessment Completed Yes   Routine   Type Initial   Assessment Calm; Approachable   Intervention Explored feelings, thoughts, concerns; Discussed illness/injury and it's impact; Discussed belief system/Gnosticist practices/reynold   Outcome Expressed gratitude;Receptive

## 2020-02-18 NOTE — OP NOTE
[id number] [facility] [address]                [address] [place] [address]                                OPERATIVE REPORT    PATIENT NAME: Diamond Heller                  :        1985  MED REC NO:   [id number]                             ROOM:       [facility]  ACCOUNT NO:   [de-identified]                           ADMIT DATE: 2020  PROVIDER:     Nain Vega    DATE OF PROCEDURE:  2020    PREOPERATIVE DIAGNOSIS:  Left proximal humerus fracture nonunion. POSTOPERATIVE DIAGNOSIS:  Left proximal humerus fracture nonunion. PROCEDURE:  1. Left humeral shaft open reduction and internal fixation with placement of  intramedullary nail. 2.  Application of allograft bone graft. 3.  Use of intraoperative fluoroscopy. ANESTHESIA:  General.    SURGEON:  Volodymyr Moreno MD    ASSISTANT:  Juan Alberto Urban DO, PGY-4.    ESTIMATED BLOOD LOSS:  200 mL. COMPLICATIONS:  None. SPECIMENS:  None. IMPLANTS:  One Synthes 9.5 x 160 mm MultiLoc left humeral nail and three  4.5 mm screws and two 4.0 mm screws. DRAINS:  None. FINDINGS:  Left proximal humerus fracture nonunion. INDICATIONS:  This is a 68-year-old male who fell at the end of 10/2019  while running injuring his foot as well as his left humerus. Imaging  performed at that time did demonstrate a nondisplaced left proximal  humerus fracture. A trial of nonoperative management was undertaken for  the past 3-1/2 months. He failed to demonstrate any callus formation  and the fracture line was still visibly seen as well as the continued  left proximal humerus pain. So, at this time, nonunion was determined  the cause of these issues. We had a discussion with the patient about  surgical and nonsurgical intervention. The patient elected to undergo  open reduction and internal fixation of left proximal humerus with  placement of an intramedullary nail and application of bone graft.    Consent was obtained and placed in the chart. All questions were  answered appropriately. Surgical risks included but not limited to  bleeding, blood clots, infection, damage to nearby tissues, vessels or  nerves, wound-healing complications, failure of procedure, need for  future surgery, stiffness, loss of motion, malunion, nonunion, scar  formation, stiffness, patient's satisfaction, loss of limb, and loss of  life were all discussed with the patient. Knowing these risks, the  patient wished to proceed with surgery as indicated. OPERATIVE PROCEDURE:  The patient was taken to the operating suite and  placed under general anesthesia without any complications. 2 gm of  Ancef were given prior to incision. The patient was placed on the 30-80  table all the way to the left side of the table to allow proper  extension of the table. A bump was placed under the left scapula. We  then preoperatively took fluoroscopic images to make sure that we can  get proper views for starting point of our nail. Once we were  satisfied, the left upper extremity was prepped and draped in normal  sterile fashion. At this time, all team members paused to identify  proper patient name, medication, site, and allergies. All team members  were in agreeance. We then used our marking pen to map our incision  just along the humeral shaft just lateral to the tip of the acromion. We then incised the skin and meticulously dissected down through the  skin and subcutaneous tissue and fascia. We sharply incised the deltoid  fascia and bluntly split the deltoid muscle and then the undersurface of  the deltoid fascia, we also split sharply. We then cleared up the  subdeltoid bursa and identified the rotator cuff. We initially placed  two 2.0 K-wires into the proximal aspect of the humerus and attempted to  manipulate the now minimally-displaced proximal humerus fracture,  however, there was very minimal movement.   We then used curettes,  rongeur, and a key elevator to identify the fracture line and clean up  any callus formation that was present. We then identified our starting  point using a K-wire center-center on both the AP and lateral views of  the left humerus. We then placed our guidewire, checked under  fluoroscopic guidance. We then used a 15-blade scalpel to directly  inline down the humeral shaft, incised our supraspinatus tendon all the  way to the muscle fibers. We then placed some tightening stitches  inside of the longitudinally split rotator cuff to retract them out of  the way for easy passage of our reamers. We took our drill sleeve,  placed it over the wire, and then placed our opening reamer, 10.5 mm  opening reamer. We then, at that time, started at 8.5 and then 0.5 mm  increments, went up to a 10.5 mm reamer ensuring that we had deepened up  the depth of the medullary canal so the nail could fit appropriately. Once we had reamed to a 10.5, we took our 9.5 mm nail, placed it into  the left humerus, ensured that we had adequate depth with repeat AP and  lateral radiographs. Once we are satisfied, we hooked down our guides  to insert onto the lateral arm, we placed our initial guide lateral to  medial and then we placed our drill into the subchondral bone of the  humeral head; it is a T-bar nail of the said height. We then used  separate guides with inside of the arm to place our screws proximally. We adequately drilled and measured and placed two 4.5 mm screws and then  in the hole that we initially drilled, we removed the drill bit and  measured and adequately placed our remainder of 4.5 mm screw. Using the  same arm, we placed our guides as well distally, and I incised the skin  and meticulously dissected down to bone. We then placed our guides on  bone and adequately drilled and measured and placed two 4.0 mm screws  distally. We then removed all our guides. We examined our fixation.    We ensured all screw lengths were appropriate. We took our final  fluoroscopic images. We then thoroughly irrigated the wound. We then  placed our cortical cancellous bone graft in the proximal humerus  nonunion and tented down appropriately. Once we were satisfied, we  repaired our rotator cuff with our FiberWire suture. Once, we had it  adequately repaired, we again irrigated the wound, and we closed the  deltoid fascia using 0-Vicryl, the deep layer using 2-0 Vicryl, the deep  dermal using 2-0 Stratafix, and then we ran a 4-0 Monocryl in a  subcutaneous fashion for all wounds. We then put Dermabond,  Steri-Strips, and then I placed Aquacel over. We also preoperatively  injected Exparel into the widest portal as well as over all the wounds  postoperatively. We then placed a Webril and a Polar Care in a simple  sling. Dr. Renetta Hill was present and participated in all aspects of the  procedure. Meticulous dissection was used. Thorough hemostasis was  achieved. The patient was awoken from general anesthesia and  transferred to PACU in stable condition.         Lizbeth Reynolds    D: 02/18/2020 10:53:35       T: 02/18/2020 12:58:24     LUIS [id number]  Job#: [id number]     Doc#: [id number]    CC:

## 2020-02-18 NOTE — H&P
History and Physical Update    Pt Name: Carito Paiz  MRN: [id number]  YOB: 1985  Date of evaluation: 2/18/2020      [x] I have reviewed the H&P by Brandi Youngblood NP on 2/5/20  which meets the criteria for an Interval History and Physical note and is attached below. [x] I have examined  Magdalena Woods  There are no changes to the patient who is scheduled for a left proximal humerus ORIF with iliac bone graft [facility]. The patient denies health changes, fever, chills, productive cough, SOB, skin changes or chest pain. .  The patient also denies taking any blood thinning medications in the past 5 days. Vital signs: /85   Pulse 84   Temp 98.1 °F (36.7 °C) (Oral)   Resp 16   Ht 5' 9\" (1.753 m)   Wt 195 lb (88.5 kg)   SpO2 96%   BMI 28.80 kg/m²     Allergies:  Patient has no known allergies. Medications:    Prior to Admission medications    Medication Sig Start Date End Date Taking? Authorizing Provider   chlorthalidone (HYGROTON) 25 MG tablet Take 25 mg by mouth daily   Yes Historical Provider, MD   fenofibrate 160 MG tablet Take 160 mg by mouth daily   Yes Historical Provider, MD   Cholecalciferol (VITAMIN D3) 50 MCG (2000 UT) CAPS Take 2,000 Units by mouth daily   Yes Historical Provider, MD   ALPRAZolam (XANAX) 0.25 MG tablet Take 0.25 mg by mouth.    Yes Historical Provider, MD   citalopram (CELEXA) 10 MG tablet Take 10 mg by mouth daily  7/15/17  Yes Historical Provider, MD   pravastatin (PRAVACHOL) 40 MG tablet Take 40 mg by mouth 7/17/17  Yes Historical Provider, MD   OMEPRAZOLE PO Take by mouth   Yes Historical Provider, MD   Cyanocobalamin (VITAMIN B 12 PO) Take by mouth   Yes Historical Provider, MD   magnesium 30 MG tablet Take by mouth   Yes Historical Provider, MD   ondansetron (ZOFRAN) 4 MG tablet Take 1 tablet by mouth every 8 hours as needed for Nausea 6/16/18   Coco Sheehan MD       This is a 37 y.o. male who is pleasant, cooperative, alert and oriented on level ground without SOB. 2) Pt is  able to climb 2 flights of stairs without SOB. 3) Pt is  able to walk up a hill for 1-2 city blocks without SOB. PATIENT HAS BEEN ORDERED BUT HAS NOT YET HAD TESTING FOR SLEEP APNEA . HE HAS BEEN POSITIVE FOR SNORING AND HAS HAD TREATMENT WITH INJECTIONS FOR MIGRAINE HEADACHES AND TMJ SYNDROME. Past Medical History:      Past Medical History        Past Medical History:   Diagnosis Date    Anxiety      Chest pain       states occurs couple times per month, left side of chest without radiation, occurs with or without acivity, states was told it's a \"spasm in the rib\"    GERD (gastroesophageal reflux disease)      Headache      Hyperlipidemia      Kidney stone              Past Surgical History:      Past Surgical History         Past Surgical History:   Procedure Laterality Date    KNEE SURGERY Right       x 2    TESTICLE TORSION REDUCTION                Medications Prior to Admission:      Home Medications           Prior to Admission medications    Medication Sig Start Date End Date Taking? Authorizing Provider   ondansetron (ZOFRAN) 4 MG tablet Take 1 tablet by mouth every 8 hours as needed for Nausea 6/16/18     Minus MD Brayan   dicyclomine (BENTYL) 10 MG capsule Take 1 capsule by mouth every 6 hours as needed (cramps) 6/16/18     Minus MD Brayan   cyclobenzaprine (FLEXERIL) 10 MG tablet Take 10 mg by mouth       Historical Provider, MD   ALPRAZolam Veto Cowden) 0.25 MG tablet Take 0.25 mg by mouth. Historical Provider, MD   citalopram (CELEXA) 10 MG tablet Take 10 mg by mouth daily  7/15/17     Historical Provider, MD   Omega-3 Fatty Acids (FISH OIL PO)   6/30/17     Historical Provider, MD   pravastatin (PRAVACHOL) 40 MG tablet Take 40 mg by mouth 7/17/17     Historical Provider, MD   topiramate (TOPAMAX) 25 MG tablet Take one daily, increase as directed.  9/13/17     Historical Provider, MD   OMEPRAZOLE PO Take by mouth Historical Provider, MD   Cyanocobalamin (VITAMIN B 12 PO) Take by mouth       Historical Provider, MD   magnesium 30 MG tablet Take by mouth       Historical Provider, MD            Allergies:      Patient has no known allergies. Social History:      Tobacco:    reports that he has quit smoking. His smoking use included cigarettes. He has a 5.00 pack-year smoking history. He has never used smokeless tobacco.  Alcohol:      reports current alcohol use. Drug Use:  reports current drug use. Drug: Marijuana. Family History:      MOTHER HAD BREAST CANCER ,SURVIVES, DAD HAD SKIN AND THEN COLON CANCER, AND SURVIVES. Review of Systems:      Positive and Negative as described in HPI. CONSTITUTIONAL:  negative for fevers, chills,HAS  sweats, fatigue, weight loss  HEENT:  negative for vision, hearing changes, runny nose, throat pain  RESPIRATORY:  negative for shortness of breath, cough, congestion, wheezing. SLEEP APNEA IS SUSPECTED   CARDIOVASCULAR:  negative for chest pain, palpitations. GASTROINTESTINAL:  negative for nausea, vomiting, HAS GERD TAKES OMEPRAZOLE NO diarrhea, constipation, change in bowel habits, abdominal pain   GENITOURINARY:  negative for difficulty of urination, burning with urination, frequency HAS HAD SEVERAL BOUTS OF KIDNEY STONES.    INTEGUMENT:  negative for rash, skin lesions, easy bruising   HEMATOLOGIC/LYMPHATIC:  negative for swelling/edema   ALLERGIC/IMMUNOLOGIC:  negative for urticaria , itching  ENDOCRINE:  negative increase in drinking, increase in urination, hot or cold intolerance  MUSCULOSKELETAL:  HAS  joint pains, muscle aches, swelling of joints HAS TMJ  NEUROLOGICAL: HAS MIGRAINE  Headaches TREATED WITH BOTOX HAS OCCASIONAL  Dizziness, NO  lightheadedness, numbness, pain, tingling extremities  BEHAVIOR/PSYCH:  IS BEING TREATED FOR  anxiety     Physical Exam:   BP (!) 139/90   Pulse 73   Resp 16   Ht 5' 9\" (1.753 m)   Wt 195 lb (88.5 kg)   SpO2 97%   BMI

## 2020-02-18 NOTE — ANESTHESIA POSTPROCEDURE EVALUATION
Department of Anesthesiology  Postprocedure Note    Patient: Jesse Adams  MRN: [id number]  YOB: 1985  Date of evaluation: 2/18/2020  Time:  12:31 PM     Procedure Summary     Date:  02/18/20 Room / Location:  [address] [place] [address] / [facility] - INPATIENT    Anesthesia Start:  5175 Anesthesia Stop:  8303    Procedure:  HUMERAL NAILING WITH ALLOGRAFT BONE GRAFT, HANNY  EXPAREL (Left ) Diagnosis:  (DX FX LEFT PROXIMAL HUMERUS)    Surgeon:  Sarah Rodriguez MD Responsible Provider:  Ottoniel Ponce MD    Anesthesia Type:  general ASA Status:  2          Anesthesia Type: general    Aravind Phase I: Aravind Score: 10    Aravind Phase II:      Last vitals: Reviewed and per EMR flowsheets.        Anesthesia Post Evaluation    Patient location during evaluation: PACU  Level of consciousness: awake and alert  Complications: no

## 2020-02-18 NOTE — BRIEF OP NOTE
Brief Postoperative Note  ______________________________________________________________    Patient: Ayaz Taveras  YOB: 1985  MRN: [id number]  Date of Procedure: 2/18/2020    Pre-Op Diagnosis: DX FX LEFT PROXIMAL HUMERUS    Post-Op Diagnosis: Same       Procedure(s):  HUMERAL NAILING WITH ALLOGRAFT BONE GRAFT, HANNY  EXPAREL    Anesthesia: General    Surgeon(s):  Akbar Bro MD    Assistant: Susan Marinelli DO    Estimated Blood Loss (mL): 293     Complications: None    Specimens:   * No specimens in log *    Implants:  Implant Name Type Inv.  Item Serial No.  Lot No. LRB No. Used   NAIL HUMERAL MULTILOC [id number] Screw/Plate/Nail/Geoffrey NAIL HUMERAL MULTILOC [id number]  SYNTHES  Left 1   SCREW TI MULTILOC 4.5X42MM ST Screw/Plate/Nail/Geoffrey SCREW TI MULTILOC 4.5X42MM ST  anywayanyday  Left 1   SCREW MULTILOC TI 4.5X38MM ST Screw/Plate/Nail/Geoffrey SCREW MULTILOC TI 4.5X38MM ST  SYNTHES  Left 1   SCREW TI MULTILOC 4.5X42MM ST Screw/Plate/Nail/Geoffrey SCREW TI MULTILOC 4.5X42MM ST  anywayanyday  Left 1   SCREW LK W/ T25 STARDRIVE 4.2I58RN Screw/Plate/Nail/Geoffrey SCREW LK W/ T25 STARDRIVE 7.3D22GG  anywayanyday [id number] Left 1   SCREW LK W/ T25 STARDRIVE 6.7E85XK Screw/Plate/Nail/Geoffrey SCREW LK W/ T25 STARDRIVE 6.2W31HE  anywayanyday [id number] Left 1         Drains: * No LDAs found *    Findings: proximal humerus nonunion    Akbar Bro MD  Date: 2/18/2020  Time: 10:18 AM

## 2020-02-18 NOTE — CONSULTS
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 *        Assesment:     · Left proximal humerus fracture, nonhealing  · Status post nailing with allograft bone graft on 2/18/2020  · Essential hypertension  · Hyperlipidemia  · Depression with anxiety      Plan:     · Patient is admitted to MedSur floor  · Oxygen to keep SPO2 more than 90%  · Check vital signs closely  · CBC BMP daily  · Pain control with narcotics  · On IV fluids to continue  · Patient is receiving postoperative IV antibiotics IV Ancef  · Continue chlorthalidone  · Continue Celexa  · Continue pravastatin and fenofibrate  · Continue to monitor  · Thank you for your consult  · DVT and GI prophylaxis. Chief Complaint:     Medical management    History of Present Illness:      Claudean Rome is a 37 y.o.  male who presents with No chief complaint on file. This is a 49-year-old gentleman who is been admitted under orthopedics, patient has history of left proximal humerus fracture which happened a few months ago, had nonhealing of the fracture, underwent surgery, patient is status post surgery today on 2/18/2020, we have been asked to see as a medical management consult, patient tells me he has past medical history significant for hypertension, hyperlipidemia, depression with anxiety gastroesophageal reflux disease, currently denies any chest pain or any shortness of breath no nausea vomiting diarrhea, no headache no palpitation.     I have personally reviewed the past medical history, past surgical history, medications, social history, and family history, and summarized in the

## 2020-02-19 VITALS
DIASTOLIC BLOOD PRESSURE: 85 MMHG | HEIGHT: 69 IN | BODY MASS INDEX: 28.88 KG/M2 | HEART RATE: 70 BPM | TEMPERATURE: 97.5 F | OXYGEN SATURATION: 99 % | WEIGHT: 195 LBS | RESPIRATION RATE: 16 BRPM | SYSTOLIC BLOOD PRESSURE: 146 MMHG

## 2020-02-19 LAB
ABSOLUTE EOS #: <0.03 K/UL (ref 0–0.44)
ABSOLUTE IMMATURE GRANULOCYTE: 0.11 K/UL (ref 0–0.3)
ABSOLUTE LYMPH #: 1.43 K/UL (ref 1.1–3.7)
ABSOLUTE MONO #: 1.37 K/UL (ref 0.1–1.2)
ANION GAP SERPL CALCULATED.3IONS-SCNC: 13 MMOL/L (ref 9–17)
BASOPHILS # BLD: 0 % (ref 0–2)
BASOPHILS ABSOLUTE: 0.03 K/UL (ref 0–0.2)
BUN BLDV-MCNC: 13 MG/DL (ref 6–20)
BUN/CREAT BLD: 10 (ref 9–20)
CALCIUM SERPL-MCNC: 9.2 MG/DL (ref 8.6–10.4)
CHLORIDE BLD-SCNC: 102 MMOL/L (ref 98–107)
CO2: 24 MMOL/L (ref 20–31)
CREAT SERPL-MCNC: 1.24 MG/DL (ref 0.7–1.2)
DIFFERENTIAL TYPE: ABNORMAL
EOSINOPHILS RELATIVE PERCENT: 0 % (ref 1–4)
GFR AFRICAN AMERICAN: >60 ML/MIN
GFR NON-AFRICAN AMERICAN: >60 ML/MIN
GFR SERPL CREATININE-BSD FRML MDRD: ABNORMAL ML/MIN/{1.73_M2}
GFR SERPL CREATININE-BSD FRML MDRD: ABNORMAL ML/MIN/{1.73_M2}
GLUCOSE BLD-MCNC: 106 MG/DL (ref 70–99)
HCT VFR BLD CALC: 36.5 % (ref 40.7–50.3)
HEMOGLOBIN: 12.3 G/DL (ref 13–17)
IMMATURE GRANULOCYTES: 1 %
LYMPHOCYTES # BLD: 10 % (ref 24–43)
MCH RBC QN AUTO: 30.8 PG (ref 25.2–33.5)
MCHC RBC AUTO-ENTMCNC: 33.7 G/DL (ref 28.4–34.8)
MCV RBC AUTO: 91.5 FL (ref 82.6–102.9)
MONOCYTES # BLD: 9 % (ref 3–12)
NRBC AUTOMATED: 0 PER 100 WBC
PDW BLD-RTO: 12.4 % (ref 11.8–14.4)
PLATELET # BLD: 257 K/UL (ref 138–453)
PLATELET ESTIMATE: ABNORMAL
PMV BLD AUTO: 10.7 FL (ref 8.1–13.5)
POTASSIUM SERPL-SCNC: 3.6 MMOL/L (ref 3.7–5.3)
RBC # BLD: 3.99 M/UL (ref 4.21–5.77)
RBC # BLD: ABNORMAL 10*6/UL
SEG NEUTROPHILS: 80 % (ref 36–65)
SEGMENTED NEUTROPHILS ABSOLUTE COUNT: 11.93 K/UL (ref 1.5–8.1)
SODIUM BLD-SCNC: 139 MMOL/L (ref 135–144)
WBC # BLD: 14.9 K/UL (ref 3.5–11.3)
WBC # BLD: ABNORMAL 10*3/UL

## 2020-02-19 PROCEDURE — 97535 SELF CARE MNGMENT TRAINING: CPT

## 2020-02-19 PROCEDURE — 80048 BASIC METABOLIC PNL TOTAL CA: CPT

## 2020-02-19 PROCEDURE — 97110 THERAPEUTIC EXERCISES: CPT

## 2020-02-19 PROCEDURE — 36415 COLL VENOUS BLD VENIPUNCTURE: CPT

## 2020-02-19 PROCEDURE — G0008 ADMIN INFLUENZA VIRUS VAC: HCPCS | Performed by: ORTHOPAEDIC SURGERY

## 2020-02-19 PROCEDURE — 97116 GAIT TRAINING THERAPY: CPT

## 2020-02-19 PROCEDURE — 97530 THERAPEUTIC ACTIVITIES: CPT

## 2020-02-19 PROCEDURE — 6370000000 HC RX 637 (ALT 250 FOR IP): Performed by: ORTHOPAEDIC SURGERY

## 2020-02-19 PROCEDURE — 97165 OT EVAL LOW COMPLEX 30 MIN: CPT

## 2020-02-19 PROCEDURE — 6360000002 HC RX W HCPCS: Performed by: ORTHOPAEDIC SURGERY

## 2020-02-19 PROCEDURE — 97161 PT EVAL LOW COMPLEX 20 MIN: CPT

## 2020-02-19 PROCEDURE — 85025 COMPLETE CBC W/AUTO DIFF WBC: CPT

## 2020-02-19 PROCEDURE — 90686 IIV4 VACC NO PRSV 0.5 ML IM: CPT | Performed by: ORTHOPAEDIC SURGERY

## 2020-02-19 PROCEDURE — G0378 HOSPITAL OBSERVATION PER HR: HCPCS

## 2020-02-19 RX ADMIN — ACETAMINOPHEN 650 MG: 325 TABLET ORAL at 00:33

## 2020-02-19 RX ADMIN — ACETAMINOPHEN 650 MG: 325 TABLET ORAL at 05:53

## 2020-02-19 RX ADMIN — INFLUENZA A VIRUS A/BRISBANE/02/2018 IVR-190 (H1N1) ANTIGEN (PROPIOLACTONE INACTIVATED), INFLUENZA A VIRUS A/KANSAS/14/2017 X-327 (H3N2) ANTIGEN (PROPIOLACTONE INACTIVATED), INFLUENZA B VIRUS B/MARYLAND/15/2016 ANTIGEN (PROPIOLACTONE INACTIVATED), INFLUENZA B VIRUS B/PHUKET/3073/2013 BVR-1B ANTIGEN (PROPIOLACTONE INACTIVATED) 0.5 ML: 15; 15; 15; 15 INJECTION, SUSPENSION INTRAMUSCULAR at 05:57

## 2020-02-19 RX ADMIN — MELATONIN 2000 UNITS: at 08:08

## 2020-02-19 RX ADMIN — CITALOPRAM 10 MG: 10 TABLET, FILM COATED ORAL at 08:08

## 2020-02-19 RX ADMIN — OXYCODONE HYDROCHLORIDE 10 MG: 5 TABLET ORAL at 04:27

## 2020-02-19 RX ADMIN — BISACODYL 5 MG: 5 TABLET, COATED ORAL at 08:08

## 2020-02-19 RX ADMIN — SENNOSIDES AND DOCUSATE SODIUM 1 TABLET: 8.6; 5 TABLET ORAL at 08:08

## 2020-02-19 RX ADMIN — DOCUSATE SODIUM 100 MG: 100 CAPSULE, LIQUID FILLED ORAL at 08:08

## 2020-02-19 RX ADMIN — OXYCODONE HYDROCHLORIDE 5 MG: 5 TABLET ORAL at 09:07

## 2020-02-19 RX ADMIN — FENOFIBRATE 160 MG: 160 TABLET ORAL at 08:08

## 2020-02-19 RX ADMIN — CHLORTHALIDONE 25 MG: 25 TABLET ORAL at 08:08

## 2020-02-19 ASSESSMENT — PAIN DESCRIPTION - ORIENTATION
ORIENTATION: LEFT
ORIENTATION: LEFT

## 2020-02-19 ASSESSMENT — PAIN DESCRIPTION - LOCATION
LOCATION: SHOULDER
LOCATION: SHOULDER

## 2020-02-19 ASSESSMENT — PAIN DESCRIPTION - FREQUENCY
FREQUENCY: CONTINUOUS
FREQUENCY: CONTINUOUS

## 2020-02-19 ASSESSMENT — PAIN - FUNCTIONAL ASSESSMENT
PAIN_FUNCTIONAL_ASSESSMENT: ACTIVITIES ARE NOT PREVENTED
PAIN_FUNCTIONAL_ASSESSMENT: ACTIVITIES ARE NOT PREVENTED

## 2020-02-19 ASSESSMENT — PAIN SCALES - GENERAL
PAINLEVEL_OUTOF10: 4
PAINLEVEL_OUTOF10: 4
PAINLEVEL_OUTOF10: 0
PAINLEVEL_OUTOF10: 7
PAINLEVEL_OUTOF10: 2
PAINLEVEL_OUTOF10: 3
PAINLEVEL_OUTOF10: 0

## 2020-02-19 ASSESSMENT — PAIN DESCRIPTION - DESCRIPTORS
DESCRIPTORS: ACHING;DISCOMFORT
DESCRIPTORS: ACHING;DISCOMFORT

## 2020-02-19 ASSESSMENT — PAIN DESCRIPTION - PAIN TYPE
TYPE: SURGICAL PAIN
TYPE: SURGICAL PAIN

## 2020-02-19 ASSESSMENT — PAIN DESCRIPTION - PROGRESSION
CLINICAL_PROGRESSION: GRADUALLY IMPROVING
CLINICAL_PROGRESSION: GRADUALLY IMPROVING

## 2020-02-19 ASSESSMENT — PAIN DESCRIPTION - ONSET
ONSET: ON-GOING
ONSET: ON-GOING

## 2020-02-19 NOTE — PROGRESS NOTES
reduction; fracture surgery (Left, 02/18/2020); and Humerus fracture surgery (Left, 2/18/2020). Pt is s/p:   Date of Procedure: 2/18/2020     Pre-Op Diagnosis: DX FX LEFT PROXIMAL HUMERUS     Post-Op Diagnosis: Same       Procedure(s):  HUMERAL NAILING WITH ALLOGRAFT BONE GRAFT, HANNY  EXPAREL      Restrictions  Restrictions/Precautions  Restrictions/Precautions: Surgical Protocols, Fall Risk  Required Braces or Orthoses?: Yes(LUE sling )  Required Braces or Orthoses  Left Upper Extremity Brace/Splint: (acquacel dressing to L shld )  Position Activity Restriction  Other position/activity restrictions: Per ortho resident note -- No heavy lifting, elbow ROM L wrist and hand, ok to come out of sling and move arm as tolerated, amb pt     Subjective   General  Chart Reviewed: Yes  Patient assessed for rehabilitation services?: Yes  Family / Caregiver Present: No  *pt states pain in LUE is 4/10 and RN issued meds.       Social/Functional History  Social/Functional History  Lives With: Family(mother, father and brother )  Type of Home: House  Home Layout: Two level, Bed/Bath upstairs, Able to Live on Main level with bedroom/bathroom(laundry on main )  Home Access: Stairs to enter with rails  Entrance Stairs - Number of Steps: 3  Entrance Stairs - Rails: Both  Bathroom Shower/Tub: Tub/Shower unit  Bathroom Toilet: Standard  Bathroom Equipment: (no DME )  Home Equipment: (no DME )  Receives Help From: Family(Pt states his family is supportive and able to assist as needed )  ADL Assistance: Independent  Homemaking Assistance: Independent(shares duties with family )  Homemaking Responsibilities: Yes  Ambulation Assistance: Independent  Transfer Assistance: Independent  Active : Yes  Mode of Transportation: Car  Occupation: Unemployed  [address]: Golf  Additional Comments: Pt reports fall in Oct 2019 and resulted in fracture of 4th metatarsal and humerus fracture; h/o migraines and vertigo       Objective Cognition  Overall Cognitive Status: Exceptions  Arousal/Alertness: Appropriate responses to stimuli  Following Commands: Follows all commands without difficulty  Attention Span: Appears intact  Memory: Appears intact  Safety Judgement: Decreased awareness of need for safety  Problem Solving: Assistance required to identify errors made;Assistance required to correct errors made;Decreased awareness of errors  Insights: Decreased awareness of deficits  Initiation: Does not require cues  Sequencing: Does not require cues  Perception  Overall Perceptual Status: WFL     Sensation  Overall Sensation Status: WFL        LUE AROM (degrees)  LUE AROM : Exceptions  LUE General AROM: Pt with full wrist and hand AROM; N/T at [facility] for elbow and shld due to sx/pain issues and focus on self care this date. RUE AROM (degrees)  RUE AROM : WFL  LUE Strength  LUE Strength Comment: N/T due to LUE sx   RUE Strength  Gross RUE Strength: WFL  RUE Strength Comment: grossly 5/5                    Plan   Plan  Times per week: 3-5x/week 1x/day as felipe   Current Treatment Recommendations: Self-Care / ADL, Pain Management, Equipment Evaluation, Education, & procurement, Positioning, Home Management Training, Patient/Caregiver Education & Training, Safety Education & Training                                                  AM-PAC Score   21          Goals  Short term goals  Time Frame for Short term goals: by discharge, pt to demo   Short term goal 1: I with LUE AROM HEP/RUE strength HEP with hand outs as needed to maintain functional use. Short term goal 2: I with wild/doff of LUE sling and total body ADL tasks with use of AE as needed to MOD I. Short term goal 3: I with EC/WS and fall prevention tech as well as AE/DME recommendations with hand outs as needed. Patient Goals   Patient goals : Get home today!        Therapy Time   Individual Concurrent Group Co-treatment   Time In 0853(plus 10 min chart review/RN communication ) Time Out 0934         Minutes 41         Timed Code Treatment Minutes: [address], [place]

## 2020-02-19 NOTE — PROGRESS NOTES
Orthopedic Progress Note    Patient:  Jocelyne Zapata  YOB: 1985     37 y.o. male    Subjective:  Patient seen and examined  No complaints or concerns  No issue overnight  Pain controlled. States polar care has helped significantly with pain  Denies fever, HA, CP, SOB, N/V, dysuria  Vitals Reviewed, Afebrile      Objective:   Vitals:    02/19/20 0423   BP: 138/78   Pulse: 86   Resp: 16   Temp: 97.7 °F (36.5 °C)   SpO2: 99%     Gen: NAD, cooperative  Cardiovascular: Regular rate, no dependent edema, distal pulses 2+  Respiratory: Chest symmetric, no accessory muscle use, normal respirations  LUE: Aquacel dressing in place. C/D/I. No breakthrough bleeding noted. Compartments soft. Axillary/Ulnar/Median/AIN/PIN/radial motor intact. C4-T1 SILT. Hand warm and well perfused. Recent Labs     02/19/20  0451   WBC 14.9*   HGB 12.3*   HCT 36.5*         K 3.6*   BUN 13   CREATININE 1.24*   GLUCOSE 106*      Meds:    See rec for complete list    Impression/plan: 37 y.o. male s/p Left Humeral IMN placement secondary to proximal humerus nonunion, POD#1    -No heavy lifting LUE. Come out of sling multiple times daily to work on elbow ROM. Always work on wrist/hand motion  -Pain control PO/IV Medication. Attempt to Wean IV medications. -DVT ppx: EPC  -continue to use polar care  -Encourage Incentive Spirometry use  -PT/OT  -DC home today. Follow up with Dr. Dwight Read in 10-14 days. All questions answered. Scripts in chart.  -Please page ortho with any questions      Dillon Soares.  Prabhjot Lemon,    Orthopedic Surgery Resident  [facility]

## 2020-02-19 NOTE — DISCHARGE SUMMARY
Orthopaedic Discharge Summary     Patient ID:  Kalpana He  [id number]  35 y.o.  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 History and physical exam were updated. Site was marked and consent obtained. Patient received perioperative antibiotics. Procedure occurred without complication. Discharge was discussed with patient. Pain was controlled. Patient discharged without incident. CONSULT SERVICES    [] Internal Medicine    [] Neurosurgery    [] Urology     [] Trauma     [] Critical Care    [] GI    [] ID     [] Neurology    [] Vascular    [] General Surgery    [] Pain Management     [x] None    [] Other:     Disposition: home    Patient Instructions:    Ankita Calero   Home Medication Instructions EZN:[id number]    Printed on:02/19/20 9677   Medication Information                      ALPRAZolam (XANAX) 0.25 MG tablet  Take 0.25 mg by mouth.              aspirin EC 81 MG EC tablet  Take 1 tablet by mouth 2 times daily for 14 days             chlorthalidone (HYGROTON) 25 MG tablet  Take 25 mg by mouth daily             Cholecalciferol (VITAMIN D3) 50 MCG (2000 UT) CAPS  Take 2,000 Units by mouth daily             citalopram (CELEXA) 10 MG tablet  Take 10 mg by mouth daily              Cyanocobalamin (VITAMIN B 12 PO)  Take by mouth             docusate sodium (COLACE) 100 MG capsule  Take 1 capsule by mouth 2 times daily             fenofibrate 160 MG tablet  Take 160 mg by mouth daily             magnesium 30 MG tablet  Take by mouth             OMEPRAZOLE PO  Take by mouth ondansetron (ZOFRAN) 4 MG tablet  Take 1 tablet by mouth every 8 hours as needed for Nausea             oxyCODONE-acetaminophen (PERCOCET) 5-325 MG per tablet  Take 1-2 tablets by mouth every 4 hours as needed for Pain for up to 7 days. pravastatin (PRAVACHOL) 40 MG tablet  Take 40 mg by mouth               Activity: See DC instructions  Wound Care: See DC instructions    Follow-up with Preston Brower MD in 10-14 days.     Stewart Merritt DO   6:05 AM 2/19/2020

## 2020-02-20 NOTE — PROGRESS NOTES
Vision/Hearing  Vision: Within Functional Limits  Hearing: Within functional limits     Subjective  General  Patient assessed for rehabilitation services?: Yes  Response To Previous Treatment: Not applicable  Family / Caregiver Present: No  Pain Screening  Patient Currently in Pain: Yes  Vital Signs  Patient Currently in Pain: Yes  Pre Treatment Pain Screening  Pain at present: 4  Scale Used: Numeric Score  Intervention List: Patient able to continue with treatment    Orientation  Orientation  Overall Orientation Status: Within Normal Limits  Social/Functional History  Social/Functional History  Lives With: Family(mother, father and brother )  Type of Home: House  Home Layout: Two level, Bed/Bath upstairs, Able to Live on Main level with bedroom/bathroom(laundry on main )  Home Access: Stairs to enter with rails  Entrance Stairs - Number of Steps: 3  Entrance Stairs - Rails: Both  Bathroom Shower/Tub: Tub/Shower unit  Bathroom Toilet: Standard  Bathroom Equipment: (no DME )  Home Equipment: (no DME )  Receives Help From: Family(Pt states his family is supportive and able to assist as needed )  ADL Assistance: Independent  Homemaking Assistance: Independent(shares duties with family )  Homemaking Responsibilities: Yes  Ambulation Assistance: Independent  Transfer Assistance: Independent  Active : Yes  Mode of Transportation: Car  Occupation: Unemployed  Leisure & Hobbies: Golf  Additional Comments: Pt reports fall in Oct 2019 and resulted in fracture of 4th metatarsal and humerus fracture; h/o migraines and vertigo       Objective     Observation/Palpation  Posture: Good  Observation: LUE sling   Scar: incision at L shld from sx/covered with acquacel dressing.       AROM RLE (degrees)  RLE AROM: WNL  AROM LLE (degrees)  LLE AROM : WNL  AROM RUE (degrees)  RUE AROM : WNL  AROM LUE (degrees)  LUE General AROM: Grossly WFL throughout; shoulder not tested due to surgery  Strength RLE  Strength RLE: WFL  Comment:

## 2020-09-08 ENCOUNTER — HOSPITAL ENCOUNTER (OUTPATIENT)
Dept: SURGERY | Age: 35
Discharge: HOME | End: 2020-09-08
Payer: MEDICARE

## 2020-09-08 VITALS
HEART RATE: 84 BPM | HEIGHT: 69 IN | BODY MASS INDEX: 31.4 KG/M2 | DIASTOLIC BLOOD PRESSURE: 89 MMHG | SYSTOLIC BLOOD PRESSURE: 131 MMHG | WEIGHT: 212 LBS | OXYGEN SATURATION: 97 %

## 2020-09-08 PROBLEM — R10.9 LEFT FLANK PAIN: Status: ACTIVE | Noted: 2020-09-08

## 2020-09-08 PROBLEM — R22.2 SUBCUTANEOUS NODULE OF ABDOMINAL WALL: Status: ACTIVE | Noted: 2020-09-08

## 2020-09-08 PROCEDURE — 99202 OFFICE O/P NEW SF 15 MIN: CPT | Performed by: SURGERY

## 2020-09-08 PROCEDURE — 99201 HC NEW PT, OUTPT VISIT LEVEL 1: CPT

## 2020-09-08 ASSESSMENT — ENCOUNTER SYMPTOMS
ABDOMINAL PAIN: 0
WHEEZING: 0
BACK PAIN: 0
SORE THROAT: 0
BLOOD IN STOOL: 0
SHORTNESS OF BREATH: 0
ABDOMINAL DISTENTION: 0
RHINORRHEA: 0
NAUSEA: 0
VOMITING: 0
COUGH: 0

## 2020-09-08 NOTE — PROGRESS NOTES
Negative for arthralgias, back pain and myalgias. Neurological: Negative for tremors and weakness. Hematological: Negative for adenopathy. Does not bruise/bleed easily. Past Medical History:    Past Medical History:   Diagnosis Date    Anxiety     Chest pain     states occurs couple times per month, left side of chest without radiation, occurs with or without acivity, states was told it's a \"spasm in the rib\"    GERD (gastroesophageal reflux disease)     Headache     Hyperlipidemia     Kidney stone        Past Surgical History:    Past Surgical History:   Procedure Laterality Date    FRACTURE SURGERY Left 02/18/2020    Humeral Nailing With Allograft Bone Graft, Hanny Exparel    HUMERUS FRACTURE SURGERY Left 2/18/2020    HUMERAL NAILING WITH ALLOGRAFT BONE GRAFT, HANNY  EXPAREL performed by Elizabeth Felton MD at [address] [place] [address] Right     x 2    TESTICLE TORSION REDUCTION         Family History:   History reviewed. No pertinent family history.     Social History:   Social History     Tobacco Use    Smoking status: Former Smoker     Packs/day: 0.50     Years: 10.00     Pack years: 5.00     Types: Cigarettes    Smokeless tobacco: Never Used    Tobacco comment: quit smoking 2 weeks ago (written 2/5/2020)   Substance Use Topics    Alcohol use: Yes     Comment: occasionally       Medications:     Current Outpatient Medications:     chlorthalidone (HYGROTON) 25 MG tablet, Take 25 mg by mouth daily, Disp: , Rfl:     fenofibrate 160 MG tablet, Take 160 mg by mouth daily, Disp: , Rfl:     Cholecalciferol (VITAMIN D3) 50 MCG (2000 UT) CAPS, Take 2,000 Units by mouth daily, Disp: , Rfl:     aspirin EC 81 MG EC tablet, Take 1 tablet by mouth 2 times daily for 14 days, Disp: 28 tablet, Rfl: 0    ondansetron (ZOFRAN) 4 MG tablet, Take 1 tablet by mouth every 8 hours as needed for Nausea, Disp: 20 tablet, Rfl: 0    ALPRAZolam (XANAX) 0.25 MG tablet, Take 0.25 mg by mouth., Disp: , Rfl:    citalopram (CELEXA) 10 MG tablet, Take 10 mg by mouth daily , Disp: , Rfl:     pravastatin (PRAVACHOL) 40 MG tablet, Take 40 mg by mouth, Disp: , Rfl:     OMEPRAZOLE PO, Take by mouth, Disp: , Rfl:     Cyanocobalamin (VITAMIN B 12 PO), Take by mouth, Disp: , Rfl:     magnesium 30 MG tablet, Take by mouth, Disp: , Rfl:     Allergies:    Patient has no known allergies. OBJECTIVE:     Vitals:    /89   Pulse 84   Ht 5' 9\" (1.753 m)   Wt 212 lb (96.2 kg)   SpO2 97%   BMI 31.31 kg/m²  Body mass index is 31.31 kg/m². Physical Exam:    GENERAL APPEARANCE: awake, alert, moderately obese 38y.o. year old male, well-oriented, and in no acute distress  ENT: sclerae are clear and white without icterus, oropharynx shows patient has his own teeth with good dental hygiene, no erythema or masses. NECK: Neck is free of lymphadenopathy or thyroid masses. LUNGS: clear, equal breath sounds bilaterally without rales, rhonchi, or wheezes. CARDIO: S1 and S2 are within normal limits, regular rate and rhythm, no murmurs, no jugular venous distention and no ankle edema. ABDOMEN: the abdomen is soft without organomegaly, masses or tenderness. No scars are present. There is no evidence of an umbilical hernia. In the left lateral abdomen, lateral to the semilunar line there is a small subcutaneous nodule palpable. This appears to be around about 1 cm in size and is somewhat firm. It is not tender to palpation and it does correspond to the lesion that the patient just recently found. There is no overlying skin discoloration. INGUINAL: No inguinal hernia can be detected on upright exam on either side. GENITOURINARY: Not examined. RECTAL: NELY not preformed  EXTREMITIES: no deformity or edema noted. Imaging: Ultrasound from August 27, 2020 performed at [facility] was reviewed. This was reported as areas of hypoechogenicity the largest being 1.4 x 0.7 cm and the smaller 0.6 x 0.7 cm in size.     I did repeat bedside ultrasound today in the clinic and can only locate one abnormality, however it corresponds to the palpable finding and is 0.6 x 0.6 cm in diameter and sits right on the underlying abdominal wall fascia. ASSESSMENT:     Assessment:   Active Hospital Problems    Diagnosis Date Noted    Subcutaneous nodule of abdominal wall [R22.2] 09/08/2020    Left flank pain [R10.9] 09/08/2020     1. There is a palpable subcutaneous nodule in the left flank which corresponds to a hypoechoic lesion laying on the anterior fascia of the abdominal wall on the left. This is very indeterminate in nature clinically. I recommended excision because of its symptomatic nature, recent appearance, and for diagnosis given the indeterminate clinical findings. Patient is agreeable. I told him I thought this was a feasible procedure for local anesthesia and he is quite agreeable to that approach as well. 2.   Consent form for excision of the subcutaneous nodule under local anesthesia as an outpatient was discussed and signed. PLAN:     1. The plan will be for excision of a subcutaneous nodule in the left lateral abdominal wall. Outpatient, local anesthesia. Electronically signed by Eduar Torres MD    This note was created with the assistance of a speech-recognition program.  Although the intention is to generate a document that actually reflects the content of the visit, no guarantees can be provided that every mistake has been identified and corrected by editing.

## 2020-09-08 NOTE — H&P
Mikhail Root MD    Physician    General Surgery    Progress Notes    Signed    Date of Service:  9/8/2020  3:30 PM          Related encounter: Preop from 9/8/2020 in [facility]  Progress Note     PATIENT NAME: Talha Carl   MRN NUMBER: [id number]  YOB: 1985  PHONE NUMBER: [phone]  PRIMARY CARE PHYSICIAN: Prachi Holly MD  TODAY'S DATE: 9/8/2020     SUBJECTIVE:      Chief Complaint: Lump left lateral abdomen        History of Present Illness: The patient is a 38 y.o. male  who presents with newly discovered lump in the left lateral abdomen. This came on recently when he took a hard swing with a golf club and developed a sharp pain in his back that radiated through to his side. Later on that evening he sneezed and had a recurrence of the pain and when he palpated his side in this area he noticed a small lump in the abdominal wall. He has not had any further pain with activities but he has noticed that direct pressure on this lump is somewhat uncomfortable. He never noticed the lump before. He did see his primary physician and an ultrasound of the abdominal wall was performed at [facility]. I have reviewed the report and images. This showed an approximately 0.6 x 0.7 x 1.4 cm hypoechoic mass in the deep subcutaneous tissue at the level of the fascia. The fascial defect was not seen. It was uncertain as to whether this was a hernia or a tumor. He was sent to the hernia clinic for evaluation and treatment of this finding after discussion with his nurse practitioner, Luis Call.     Review of Systems:  Review of Systems   Constitutional: Negative for chills, fever and unexpected weight change. HENT: Negative for congestion, rhinorrhea and sore throat. Eyes: Negative for visual disturbance. Respiratory: Negative for cough, shortness of breath and wheezing. Cardiovascular: Negative for chest pain and palpitations. Gastrointestinal: Negative for abdominal distention, abdominal pain, blood in stool, nausea and vomiting. Genitourinary: Negative for difficulty urinating, dysuria and hematuria. Musculoskeletal: Negative for arthralgias, back pain and myalgias. Neurological: Negative for tremors and weakness. Hematological: Negative for adenopathy. Does not bruise/bleed easily.         Past Medical History:    Past Medical History        Past Medical History:   Diagnosis Date    Anxiety      Chest pain       states occurs couple times per month, left side of chest without radiation, occurs with or without acivity, states was told it's a \"spasm in the rib\"    GERD (gastroesophageal reflux disease)      Headache      Hyperlipidemia      Kidney stone             Past Surgical History:    Past Surgical History         Past Surgical History:   Procedure Laterality Date    FRACTURE SURGERY Left 02/18/2020     Humeral Nailing With Allograft Bone Graft, Hanny Exparel    HUMERUS FRACTURE SURGERY Left 2/18/2020     HUMERAL NAILING WITH ALLOGRAFT BONE GRAFT, HANNY  EXPAREL [facility]       x 2    TESTICLE TORSION REDUCTION               Family History:   Family History   History reviewed. No pertinent family history.        Social History:   Social History            Tobacco Use    Smoking status: Former Smoker       Packs/day: 0.50       Years: 10.00       Pack years: 5.00       Types: Cigarettes    Smokeless tobacco: Never Used    Tobacco comment: quit smoking 2 weeks ago (written 2/5/2020)   Substance Use Topics    Alcohol use:  Yes       Comment: occasionally        Medications:   Current Medication     Current Outpatient Medications:     chlorthalidone (HYGROTON) 25 MG tablet, Take 25 mg by mouth daily, Disp: , Rfl:     fenofibrate 160 MG tablet, Take 160 mg by mouth daily, Disp: , Rfl:    Cholecalciferol (VITAMIN D3) 50 MCG (2000 UT) CAPS, Take 2,000 Units by mouth daily, Disp: , Rfl:     aspirin EC 81 MG EC tablet, Take 1 tablet by mouth 2 times daily for 14 days, Disp: 28 tablet, Rfl: 0    ondansetron (ZOFRAN) 4 MG tablet, Take 1 tablet by mouth every 8 hours as needed for Nausea, Disp: 20 tablet, Rfl: 0    ALPRAZolam (XANAX) 0.25 MG tablet, Take 0.25 mg by mouth., Disp: , Rfl:     citalopram (CELEXA) 10 MG tablet, Take 10 mg by mouth daily , Disp: , Rfl:     pravastatin (PRAVACHOL) 40 MG tablet, Take 40 mg by mouth, Disp: , Rfl:     OMEPRAZOLE PO, Take by mouth, Disp: , Rfl:     Cyanocobalamin (VITAMIN B 12 PO), Take by mouth, Disp: , Rfl:     magnesium 30 MG tablet, Take by mouth, Disp: , Rfl:         Allergies:    Patient has no known allergies.     OBJECTIVE:      Vitals:    /89   Pulse 84   Ht 5' 9\" (1.753 m)   Wt 212 lb (96.2 kg)   SpO2 97%   BMI 31.31 kg/m²  Body mass index is 31.31 kg/m².     Physical Exam:    GENERAL APPEARANCE: awake, alert, moderately obese 38y.o. year old male, well-oriented, and in no acute distress  ENT: sclerae are clear and white without icterus, oropharynx shows patient has his own teeth with good dental hygiene, no erythema or masses. NECK: Neck is free of lymphadenopathy or thyroid masses. LUNGS: clear, equal breath sounds bilaterally without rales, rhonchi, or wheezes. CARDIO: S1 and S2 are within normal limits, regular rate and rhythm, no murmurs, no jugular venous distention and no ankle edema. ABDOMEN: the abdomen is soft without organomegaly, masses or tenderness. No scars are present. There is no evidence of an umbilical hernia. In the left lateral abdomen, lateral to the semilunar line there is a small subcutaneous nodule palpable. This appears to be around about 1 cm in size and is somewhat firm. It is not tender to palpation and it does correspond to the lesion that the patient just recently found.   There is no overlying skin discoloration. INGUINAL: No inguinal hernia can be detected on upright exam on either side. GENITOURINARY: Not examined. RECTAL: NELY not preformed  EXTREMITIES: no deformity or edema noted.        Imaging: Ultrasound from August 27, 2020 performed at [facility] was reviewed. This was reported as areas of hypoechogenicity the largest being 1.4 x 0.7 cm and the smaller 0.6 x 0.7 cm in size.     I did repeat bedside ultrasound today in the clinic and can only locate one abnormality, however it corresponds to the palpable finding and is 0.6 x 0.6 cm in diameter and sits right on the underlying abdominal wall fascia.     ASSESSMENT:      Assessment:        Active Hospital Problems     Diagnosis Date Noted    Subcutaneous nodule of abdominal wall [R22.2] 09/08/2020    Left flank pain [R10.9] 09/08/2020     1. There is a palpable subcutaneous nodule in the left flank which corresponds to a hypoechoic lesion laying on the anterior fascia of the abdominal wall on the left. This is very indeterminate in nature clinically. I recommended excision because of its symptomatic nature, recent appearance, and for diagnosis given the indeterminate clinical findings. Patient is agreeable. I told him I thought this was a feasible procedure for local anesthesia and he is quite agreeable to that approach as well. 2.   Consent form for excision of the subcutaneous nodule under local anesthesia as an outpatient was discussed and signed.     PLAN:      1. The plan will be for excision of a subcutaneous nodule in the left lateral abdominal wall. Outpatient, local anesthesia.        Electronically signed by Juan Torres MD     This note was created with the assistance of a speech-recognition program.  Although the intention is to generate a document that actually reflects the content of the visit, no guarantees can be provided that every mistake has been identified and corrected by editing.

## 2020-09-09 RX ORDER — LIDOCAINE HYDROCHLORIDE 10 MG/ML
1 INJECTION, SOLUTION EPIDURAL; INFILTRATION; INTRACAUDAL; PERINEURAL
Status: CANCELLED | OUTPATIENT
Start: 2020-09-10 | End: 2020-09-10

## 2020-09-09 RX ORDER — SODIUM CHLORIDE, SODIUM LACTATE, POTASSIUM CHLORIDE, CALCIUM CHLORIDE 600; 310; 30; 20 MG/100ML; MG/100ML; MG/100ML; MG/100ML
INJECTION, SOLUTION INTRAVENOUS CONTINUOUS
Status: CANCELLED | OUTPATIENT
Start: 2020-09-10

## 2020-09-10 ENCOUNTER — HOSPITAL ENCOUNTER (OUTPATIENT)
Age: 35
Setting detail: OUTPATIENT SURGERY
Discharge: HOME | End: 2020-09-10
Attending: SURGERY | Admitting: SURGERY
Payer: MEDICARE

## 2020-09-10 VITALS
SYSTOLIC BLOOD PRESSURE: 126 MMHG | TEMPERATURE: 97.2 F | WEIGHT: 210 LBS | HEART RATE: 68 BPM | HEIGHT: 69 IN | DIASTOLIC BLOOD PRESSURE: 84 MMHG | OXYGEN SATURATION: 98 % | BODY MASS INDEX: 31.1 KG/M2 | RESPIRATION RATE: 18 BRPM

## 2020-09-10 PROBLEM — R22.2 SUBCUTANEOUS NODULE OF ABDOMINAL WALL: Status: RESOLVED | Noted: 2020-09-08 | Resolved: 2020-09-10

## 2020-09-10 PROCEDURE — 6360000002 HC RX W HCPCS: Performed by: SURGERY

## 2020-09-10 PROCEDURE — 22903 EXC ABD LES SC 3 CM/>: CPT | Performed by: SURGERY

## 2020-09-10 PROCEDURE — 7100000010 HC PHASE II RECOVERY - FIRST 15 MIN: Performed by: SURGERY

## 2020-09-10 PROCEDURE — 2709999900 HC NON-CHARGEABLE SUPPLY: Performed by: SURGERY

## 2020-09-10 PROCEDURE — 2580000003 HC RX 258: Performed by: SURGERY

## 2020-09-10 PROCEDURE — 2500000003 HC RX 250 WO HCPCS: Performed by: SURGERY

## 2020-09-10 PROCEDURE — 3600000002 HC SURGERY LEVEL 2 BASE: Performed by: SURGERY

## 2020-09-10 PROCEDURE — 88304 TISSUE EXAM BY PATHOLOGIST: CPT

## 2020-09-10 PROCEDURE — 3600000012 HC SURGERY LEVEL 2 ADDTL 15MIN: Performed by: SURGERY

## 2020-09-10 ASSESSMENT — PAIN SCALES - GENERAL: PAINLEVEL_OUTOF10: 0

## 2020-09-10 ASSESSMENT — PAIN - FUNCTIONAL ASSESSMENT: PAIN_FUNCTIONAL_ASSESSMENT: 0-10

## 2020-09-10 NOTE — OP NOTE
Operative Note      Patient: Nany Willingham  YOB: 1985  MRN: [id number]    Date of Procedure: 9/10/2020    Pre-Op Diagnosis: DX SUBCUTANEOUS MASS LEFT ABDOMEN    Post-Op Diagnosis: Same, pending pathology       Procedure(s):  EXCISION SUBCUTANEOUS MASS LEFT ABDOMEN    Surgeon(s):  Mirta Harris MD    Assistant: MILA Subramanian Anesthesia: Local    Estimated Blood Loss (mL): Minimal    Complications: None    Specimens:   ID Type Source Tests Collected by Time Destination   A : LEFT ABDOMEN WALL SUBCUTANEOUS MASS Tissue Abdomen SURGICAL PATHOLOGY Mirta Hraris MD 9/10/2020 0740      Indications and Consent: This is a 55-year-old male who recently noted a subcutaneous lump in the left lateral abdomen. It is sensitive to palpation. An ultrasound the abdominal wall was performed which shows 2 hypoechoic nodular densities in the deep subcutaneous tissue just anterior to the anterior abdominal wall. These do not appear to be simple lipomas. Given the symptoms and findings it was recommended he undergo excision and the patient agrees understanding the indications as well as the risks. Findings: 3.5 x 2 cm firm deep subcutaneous mass    Detailed Description of Procedure: With the patient supine on the operating table the left lateral abdomen was prepped and draped in usual sterile fashion. The area of the lesion was marked prior to prepping and draping. A field block of local anesthesia was instituted using a mixture of 0.5% lidocaine with epinephrine and 0.25% bupivacaine plain. A transverse skin incision was made over the area of the palpable lump and carried down through the subcutaneous tissue and Mina's fascia. In the deep subcutaneous tissue and irregular and very firm nodule could be palpated. This was excised sharply from the surrounding tissue.   It was laying directly on the anterior abdominal wall fascia without any apparent connection or involvement of the anterior abdominal wall. On its medial aspect it did appear that there was a peripheral nerve which was clamped and tied with a suture ligature of 3-0 Vicryl. This raises the possibility that this may be an nerve sheath tumor such as neurilemmoma. It was firm and irregular and not clinically typical for a simple lipoma. The specimen was 3.5 cm in length and 2 cm in diameter. Specimen was sent to pathology and formalin. Bleeding within the wound was controlled electrocautery during and after dissection. The wound was irrigated with saline solution containing gentamicin. The wound was closed in layers using interrupted sutures of 3-0 Monocryl for the deep subcutaneous tissue, Mina's fascia and the deep dermis. The skin was closed with a combination of interrupted subcuticular sutures of 4-0 Monocryl and dermal adhesive. Patient tolerated the procedure well and was transferred to the postoperative care area with plans to be discharged directly to home. He will follow-up in my office in 1 week for wound check and to review pathology findings.     Electronically signed by Juan Torres MD on 9/10/2020 at 7:59 AM

## 2020-09-11 LAB — SURGICAL PATHOLOGY REPORT: NORMAL

## 2020-09-16 ENCOUNTER — HOSPITAL ENCOUNTER (OUTPATIENT)
Dept: SURGERY | Age: 35
Discharge: HOME | End: 2020-09-16
Payer: MEDICARE

## 2020-09-16 VITALS
HEIGHT: 69 IN | DIASTOLIC BLOOD PRESSURE: 83 MMHG | HEART RATE: 85 BPM | BODY MASS INDEX: 30.81 KG/M2 | OXYGEN SATURATION: 99 % | WEIGHT: 208 LBS | SYSTOLIC BLOOD PRESSURE: 120 MMHG

## 2020-09-16 PROBLEM — Z09 POSTOPERATIVE FOLLOW-UP: Status: ACTIVE | Noted: 2020-09-16

## 2020-09-16 PROCEDURE — 99024 POSTOP FOLLOW-UP VISIT: CPT | Performed by: SURGERY

## 2020-09-16 PROCEDURE — 99211 OFF/OP EST MAY X REQ PHY/QHP: CPT

## 2020-09-16 NOTE — PROGRESS NOTES
Source of Specimen   1: LEFT ABDOMEN WALL SUBCUTANEOUS MASS     Gross Description   \"VEE KELLY, LEFT ABDOMEN WALL SUBCUTANEOUS MASS\" 5.5 x 2.8 x 2.0   cm portion of disrupted fibrofatty tissue.  Sectioning reveals no   hemorrhage; however, there areas of necrosis.  Representative sections   1cs.  tm       Microscopic Description   Microscopic examination performed. SURGICAL PATHOLOGY CONSULTATION         Patient Name: Ayla Hicks [facility] Rec: [id number]   Path Number: [id number]     [address] [place] [address], [place] [address]. [place], [address]   [phone]   Fax: [phone]         ASSESSMENT:     Assessment:  1. Doing well after excision of a mass in the left abdominal wall which turned out to be fat necrosis. He will not need any further treatment. No activity restrictions are necessary. PLAN:     1. He may follow with me now on a as needed basis. Electronically signed by Rhianna Colindres MD    This note was created with the assistance of a speech-recognition program.  Although the intention is to generate a document that actually reflects the content of the visit, no guarantees can be provided that every mistake has been identified and corrected by editing.

## 2020-10-16 PROBLEM — Z09 POSTOPERATIVE FOLLOW-UP: Status: RESOLVED | Noted: 2020-09-16 | Resolved: 2020-10-16

## 2020-12-07 ENCOUNTER — HOSPITAL ENCOUNTER (OUTPATIENT)
Dept: SURGERY | Age: 35
Discharge: HOME | End: 2020-12-07
Payer: MEDICARE

## 2020-12-07 VITALS
HEIGHT: 69 IN | OXYGEN SATURATION: 97 % | SYSTOLIC BLOOD PRESSURE: 130 MMHG | BODY MASS INDEX: 29.62 KG/M2 | DIASTOLIC BLOOD PRESSURE: 91 MMHG | HEART RATE: 75 BPM | WEIGHT: 200 LBS

## 2020-12-07 PROBLEM — T81.89XA SUTURE GRANULOMA: Status: ACTIVE | Noted: 2020-12-07

## 2020-12-07 PROCEDURE — 99024 POSTOP FOLLOW-UP VISIT: CPT | Performed by: SURGERY

## 2020-12-07 PROCEDURE — 99211 OFF/OP EST MAY X REQ PHY/QHP: CPT | Performed by: SURGERY

## 2020-12-07 NOTE — PROGRESS NOTES
[facility]  Progress Note    PATIENT NAME: Jessika Archibald   MRN NUMBER: [id number]  YOB: 1985  PHONE NUMBER: [phone]  PRIMARY CARE PHYSICIAN: Monse Long MD  TODAY'S DATE: 12/7/2020    SUBJECTIVE:     Chief Complaint: Chronic recurring irritation and drainage surgery site left flank      History of Present Illness:  Nilson Lacey underwent surgery September 10, 2020 with removal of a mass in the deep subcutaneous tissue in the left lower abdominal wall. This turned out to be fat necrosis. He had not had previous surgery in that area and no hernia was identified. Likely this was related to trauma. The patient speculated probably related to his hockey playing. The patient did well after surgery and his wound healed well. However over the last several weeks to months he has had a recurring area in the midportion of his wound where this will start to become irritated then opened and drained a small dropper to what he describes as brownish light-colored fluid. It will then heal up but then after several days the process will repeat. This last occurred on December 3. He has been keeping this covered and he has not had any drainage but now for the last 24 hours. Review of Systems:  Review of Systems  Review of systems is negative for any fever or chills or generalized aches. He denies cardiac respiratory or GI symptoms.     Past Medical History:    Past Medical History:   Diagnosis Date    Anxiety     Chest pain     states occurs couple times per month, left side of chest without radiation, occurs with or without acivity, states was told it's a \"spasm in the rib\"    GERD (gastroesophageal reflux disease)     Headache     Hyperlipidemia     Kidney stone        Past Surgical History:    Past Surgical History:   Procedure Laterality Date    ABDOMEN SURGERY Left 9/10/2020    EXCISION SUBCUTANEOUS MASS LEFT ABDOMEN performed by Sabina Godwin MD at [address] [place] [address] FRACTURE SURGERY Left 2020    Humeral Nailing With Allograft Bone Graft, Hanny Exparel    HUMERUS FRACTURE SURGERY Left 2020    HUMERAL NAILING WITH ALLOGRAFT BONE GRAFT, HANNY  EXPAREL performed by Donaldo Carreon MD at [place] [address]. Right     x 2    TESTICLE TORSION REDUCTION      WISDOM TOOTH EXTRACTION         Family History:   History reviewed. No pertinent family history. Social History:   Social History     Tobacco Use    Smoking status: Former Smoker     Packs/day: 0.50     Years: 10.00     Pack years: 5.00     Types: Cigarettes     Last attempt to quit: 2020     Years since quittin.8    Smokeless tobacco: Never Used    Tobacco comment: quit smoking 2 weeks ago (written 2020)   Substance Use Topics    Alcohol use: Not Currently       Medications:     Current Outpatient Medications:     chlorthalidone (HYGROTON) 25 MG tablet, Take 25 mg by mouth daily, Disp: , Rfl:     fenofibrate 160 MG tablet, Take 160 mg by mouth daily, Disp: , Rfl:     Cholecalciferol (VITAMIN D3) 50 MCG (2000 UT) CAPS, Take 2,000 Units by mouth daily, Disp: , Rfl:     ondansetron (ZOFRAN) 4 MG tablet, Take 1 tablet by mouth every 8 hours as needed for Nausea, Disp: 20 tablet, Rfl: 0    ALPRAZolam (XANAX) 0.25 MG tablet, Take 0.25 mg by mouth., Disp: , Rfl:     citalopram (CELEXA) 10 MG tablet, Take 10 mg by mouth daily , Disp: , Rfl:     pravastatin (PRAVACHOL) 40 MG tablet, Take 40 mg by mouth, Disp: , Rfl:     OMEPRAZOLE PO, Take by mouth, Disp: , Rfl:     Cyanocobalamin (VITAMIN B 12 PO), Take by mouth, Disp: , Rfl:     magnesium 30 MG tablet, Take by mouth, Disp: , Rfl:     aspirin EC 81 MG EC tablet, Take 1 tablet by mouth 2 times daily for 14 days, Disp: 28 tablet, Rfl: 0    Allergies:    Patient has no known allergies.     OBJECTIVE:     Vitals:    BP (!) 130/91   Pulse 75   Ht 5' 9\" (1.753 m)   Wt 200 lb (90.7 kg)   SpO2 97%   BMI 29.53 kg/m²  Body mass index is 29.53 kg/m². Physical Exam:    GENERAL APPEARANCE: awake, alert, moderately obese 38y.o. year old male, well-oriented, and in no acute distress    LUNGS: clear, equal breath sounds bilaterally without rales, rhonchi, or wheezes. CARDIO: S1 and S2 are within normal limits, regular rate and rhythm, no murmurs, no jugular venous distention and no ankle edema. ABDOMEN: the abdomen is soft without organomegaly, masses or tenderness. In the midportion of his scar in the left flank there is a small area of pigment change and some slight induration without erythema. This feels like heavy scarring consistent with his history of repeated irritations. There is no residual subcutaneous mass or other problem. I did cleanse the area with alcohol  and using a forcep removed the thin layer of skin over the area of induration. I cannot find any evidence of retained suture or foreign body in this area and there was no residual drainage. I treated the underlying flat granulation bed with silver nitrate. ASSESSMENT:     Assessment:  Active Hospital Problems    Diagnosis Date Noted    Suture granuloma Benjie Bustos 12/07/2020     1. Review of the patient's operative note shows that all of the sutures that were used were absorbable sutures, and fairly short acting (Monocryl). It is possible that there was a suture knot here that is repeatedly becoming irritating and draining. There is not a large draining sinus and I really think this is most likely due to a suture knot. It may have already expelled itself. For the present I think a conservative course of observation would be most appropriate. PLAN:     1. I recommended he simply wash the wound carefully with soap and water daily and apply sterile Band-Aid over the wound. I would like to see it again in 2 weeks and see whether this has resolved itself or not.   If he is continuing to have repeated episodes of this partially healing and then reopening then wound exploration may be in order. I would think that by that time however there should not be any residual suture material.  However some retained fat necrosis may behave in the same manner. Follow-up: 2 weeks. Electronically signed by Ludy Silver MD    This note was created with the assistance of a speech-recognition program.  Although the intention is to generate a document that actually reflects the content of the visit, no guarantees can be provided that every mistake has been identified and corrected by editing.

## 2020-12-21 ENCOUNTER — HOSPITAL ENCOUNTER (OUTPATIENT)
Dept: SURGERY | Age: 35
Discharge: HOME | End: 2020-12-21
Payer: MEDICARE

## 2020-12-21 VITALS
WEIGHT: 197 LBS | HEART RATE: 95 BPM | HEIGHT: 69 IN | SYSTOLIC BLOOD PRESSURE: 140 MMHG | BODY MASS INDEX: 29.18 KG/M2 | OXYGEN SATURATION: 98 % | DIASTOLIC BLOOD PRESSURE: 81 MMHG

## 2020-12-21 PROCEDURE — 99211 OFF/OP EST MAY X REQ PHY/QHP: CPT

## 2020-12-21 PROCEDURE — 99024 POSTOP FOLLOW-UP VISIT: CPT | Performed by: SURGERY

## 2020-12-21 NOTE — PROGRESS NOTES
[address]  Progress note    PATIENT NAME: Zunilda Mcmillan   MRN NUMBER: [id number]  YOB: 1985  PHONE NUMBER: [phone]  PRIMARY CARE PHYSICIAN: Emi Lua MD  TODAY'S DATE: 12/21/2020    SUBJECTIVE:     Chief Complaint: F/U regarding irritation/drainage at surgery site, LLQ    History of Present Illness: Nishi Marroquin underwent surgery September 10, 2020 with removal of a mass in the deep subcutaneous tissue in the left lower abdominal wall. This turned out to be fat necrosis. He had not had previous surgery in that area and no hernia was identified. Likely this was related to trauma. The patient speculated probably related to his hockey playing. He was seen back in the [address] 12/7 for a c/o of intermittent irritation and drainage of this area. When seen on that date the wound had been free of drainage for 24 hours. I could not find any evidence of retained suture or other problem at that time. He was instructed in local wound care and recommended to follow up again in two weeks. He reports problems with the wound since he was last seen. He has had no recurrent drainage.     Date of Surgery: 9/10/2020    Past Medical History:    Past Medical History:   Diagnosis Date    Anxiety     Chest pain     states occurs couple times per month, left side of chest without radiation, occurs with or without acivity, states was told it's a \"spasm in the rib\"    GERD (gastroesophageal reflux disease)     Headache     Hyperlipidemia     Kidney stone        Past Surgical History:    Past Surgical History:   Procedure Laterality Date    ABDOMEN SURGERY Left 9/10/2020    EXCISION SUBCUTANEOUS MASS LEFT ABDOMEN performed by Gilles Howard MD at [place] [id number] Left 02/18/2020    Humeral Nailing With Allograft Bone Graft, Hanny Exparel    HUMERUS FRACTURE SURGERY Left 2/18/2020    HUMERAL NAILING WITH ALLOGRAFT BONE GRAFT, HANNY  EXPAREL performed by Jade Borja

## 2022-04-30 ENCOUNTER — APPOINTMENT (OUTPATIENT)
Dept: GENERAL RADIOLOGY | Age: 37
End: 2022-04-30
Payer: MEDICARE

## 2022-04-30 ENCOUNTER — HOSPITAL ENCOUNTER (EMERGENCY)
Age: 37
Discharge: HOME | End: 2022-04-30
Attending: EMERGENCY MEDICINE
Payer: MEDICARE

## 2022-04-30 VITALS
DIASTOLIC BLOOD PRESSURE: 94 MMHG | HEIGHT: 68 IN | TEMPERATURE: 99.4 F | RESPIRATION RATE: 22 BRPM | SYSTOLIC BLOOD PRESSURE: 134 MMHG | BODY MASS INDEX: 26.52 KG/M2 | HEART RATE: 112 BPM | WEIGHT: 175 LBS | OXYGEN SATURATION: 99 %

## 2022-04-30 DIAGNOSIS — E87.6 HYPOKALEMIA: ICD-10-CM

## 2022-04-30 DIAGNOSIS — E86.0 DEHYDRATION: Primary | ICD-10-CM

## 2022-04-30 LAB
-: ABNORMAL
ABSOLUTE EOS #: 0.1 K/UL (ref 0–0.4)
ABSOLUTE LYMPH #: 1 K/UL (ref 1–4.8)
ABSOLUTE MONO #: 0.9 K/UL (ref 0.1–1.2)
ALBUMIN SERPL-MCNC: 5.5 G/DL (ref 3.5–5.2)
ALBUMIN/GLOBULIN RATIO: 1.9 (ref 1–2.5)
ALP BLD-CCNC: 66 U/L (ref 40–129)
ALT SERPL-CCNC: 22 U/L (ref 5–41)
ANION GAP SERPL CALCULATED.3IONS-SCNC: 19 MMOL/L (ref 9–17)
AST SERPL-CCNC: 21 U/L
BACTERIA: ABNORMAL
BASOPHILS # BLD: 0 % (ref 0–2)
BASOPHILS ABSOLUTE: 0 K/UL (ref 0–0.2)
BILIRUB SERPL-MCNC: 0.76 MG/DL (ref 0.3–1.2)
BILIRUBIN URINE: NEGATIVE
BUN BLDV-MCNC: 22 MG/DL (ref 6–20)
CALCIUM SERPL-MCNC: 10.8 MG/DL (ref 8.6–10.4)
CHLORIDE BLD-SCNC: 91 MMOL/L (ref 98–107)
CO2: 19 MMOL/L (ref 20–31)
COLOR: YELLOW
CREAT SERPL-MCNC: 1.42 MG/DL (ref 0.7–1.2)
EOSINOPHILS RELATIVE PERCENT: 1 % (ref 1–4)
EPITHELIAL CELLS UA: ABNORMAL /HPF (ref 0–5)
FLU A ANTIGEN: NEGATIVE
FLU B ANTIGEN: NEGATIVE
GFR AFRICAN AMERICAN: >60 ML/MIN
GFR NON-AFRICAN AMERICAN: 56 ML/MIN
GFR SERPL CREATININE-BSD FRML MDRD: ABNORMAL ML/MIN/{1.73_M2}
GLUCOSE BLD-MCNC: 140 MG/DL (ref 70–99)
GLUCOSE URINE: NEGATIVE
HCT VFR BLD CALC: 44.2 % (ref 41–53)
HEMOGLOBIN: 15.8 G/DL (ref 13.5–17.5)
KETONES, URINE: NEGATIVE
LEUKOCYTE ESTERASE, URINE: NEGATIVE
LYMPHOCYTES # BLD: 9 % (ref 24–44)
MAGNESIUM: 1.7 MG/DL (ref 1.6–2.6)
MCH RBC QN AUTO: 30.9 PG (ref 26–34)
MCHC RBC AUTO-ENTMCNC: 35.8 G/DL (ref 31–37)
MCV RBC AUTO: 86.5 FL (ref 80–100)
MONOCYTES # BLD: 8 % (ref 2–11)
NITRITE, URINE: NEGATIVE
OTHER OBSERVATIONS UA: ABNORMAL
PDW BLD-RTO: 13.2 % (ref 12.5–15.4)
PH UA: 6.5 (ref 5–8)
PLATELET # BLD: 333 K/UL (ref 140–450)
PMV BLD AUTO: 9 FL (ref 6–12)
POTASSIUM SERPL-SCNC: 3 MMOL/L (ref 3.7–5.3)
PROTEIN UA: ABNORMAL
RBC # BLD: 5.1 M/UL (ref 4.5–5.9)
RBC UA: ABNORMAL /HPF (ref 0–2)
SARS-COV-2, RAPID: NOT DETECTED
SEG NEUTROPHILS: 82 % (ref 36–66)
SEGMENTED NEUTROPHILS ABSOLUTE COUNT: 9 K/UL (ref 1.8–7.7)
SODIUM BLD-SCNC: 129 MMOL/L (ref 135–144)
SPECIFIC GRAVITY UA: 1.02 (ref 1–1.03)
SPECIMEN DESCRIPTION: NORMAL
TOTAL PROTEIN: 8.4 G/DL (ref 6.4–8.3)
TROPONIN, HIGH SENSITIVITY: 9 NG/L (ref 0–22)
TURBIDITY: CLEAR
URINE HGB: ABNORMAL
UROBILINOGEN, URINE: NORMAL
WBC # BLD: 11 K/UL (ref 3.5–11)
WBC UA: ABNORMAL /HPF (ref 0–5)

## 2022-04-30 PROCEDURE — 80053 COMPREHEN METABOLIC PANEL: CPT

## 2022-04-30 PROCEDURE — 87804 INFLUENZA ASSAY W/OPTIC: CPT

## 2022-04-30 PROCEDURE — 87635 SARS-COV-2 COVID-19 AMP PRB: CPT

## 2022-04-30 PROCEDURE — 6370000000 HC RX 637 (ALT 250 FOR IP): Performed by: EMERGENCY MEDICINE

## 2022-04-30 PROCEDURE — 71046 X-RAY EXAM CHEST 2 VIEWS: CPT

## 2022-04-30 PROCEDURE — 85025 COMPLETE CBC W/AUTO DIFF WBC: CPT

## 2022-04-30 PROCEDURE — 99285 EMERGENCY DEPT VISIT HI MDM: CPT

## 2022-04-30 PROCEDURE — 2580000003 HC RX 258: Performed by: EMERGENCY MEDICINE

## 2022-04-30 PROCEDURE — 84484 ASSAY OF TROPONIN QUANT: CPT

## 2022-04-30 PROCEDURE — 81001 URINALYSIS AUTO W/SCOPE: CPT

## 2022-04-30 PROCEDURE — 36415 COLL VENOUS BLD VENIPUNCTURE: CPT

## 2022-04-30 PROCEDURE — 83735 ASSAY OF MAGNESIUM: CPT

## 2022-04-30 PROCEDURE — 96360 HYDRATION IV INFUSION INIT: CPT

## 2022-04-30 PROCEDURE — 93005 ELECTROCARDIOGRAM TRACING: CPT | Performed by: EMERGENCY MEDICINE

## 2022-04-30 RX ORDER — GUAIFENESIN 400 MG/1
400 TABLET ORAL 4 TIMES DAILY PRN
COMMUNITY
End: 2022-09-18

## 2022-04-30 RX ORDER — LEVOFLOXACIN 5 MG/ML
500 INJECTION, SOLUTION INTRAVENOUS
COMMUNITY
Start: 2022-04-27 | End: 2022-09-18

## 2022-04-30 RX ORDER — 0.9 % SODIUM CHLORIDE 0.9 %
1000 INTRAVENOUS SOLUTION INTRAVENOUS ONCE
Status: COMPLETED | OUTPATIENT
Start: 2022-04-30 | End: 2022-04-30

## 2022-04-30 RX ORDER — POTASSIUM CHLORIDE 20 MEQ/1
40 TABLET, EXTENDED RELEASE ORAL ONCE
Status: COMPLETED | OUTPATIENT
Start: 2022-04-30 | End: 2022-04-30

## 2022-04-30 RX ORDER — POTASSIUM CHLORIDE 20 MEQ/1
20 TABLET, EXTENDED RELEASE ORAL DAILY
Qty: 5 TABLET | Refills: 0 | Status: SHIPPED | OUTPATIENT
Start: 2022-04-30 | End: 2022-09-18

## 2022-04-30 RX ORDER — ONDANSETRON 4 MG/1
4 TABLET, ORALLY DISINTEGRATING ORAL EVERY 8 HOURS PRN
Qty: 15 TABLET | Refills: 0 | Status: SHIPPED | OUTPATIENT
Start: 2022-04-30 | End: 2022-09-18

## 2022-04-30 RX ADMIN — POTASSIUM CHLORIDE 40 MEQ: 1500 TABLET, EXTENDED RELEASE ORAL at 11:19

## 2022-04-30 RX ADMIN — SODIUM CHLORIDE 1000 ML: 9 INJECTION, SOLUTION INTRAVENOUS at 09:45

## 2022-04-30 ASSESSMENT — PAIN - FUNCTIONAL ASSESSMENT: PAIN_FUNCTIONAL_ASSESSMENT: NONE - DENIES PAIN

## 2022-04-30 NOTE — ED PROVIDER NOTES
[address] [facility]  [address] [facility] [address] [facility] [address]  Phone: [phone]  Fax: [phone]        Pt Name: Marcellus Pelayo  MRN: [id number]  [place] 1985  Date of evaluation: 4/30/22      CHIEF COMPLAINT     Chief Complaint   Patient presents with    Cough    Dizziness    Shortness of Breath    Fatigue         HISTORY OF PRESENT ILLNESS  (Location/Symptom, Timing/Onset, Context/Setting, Quality, Duration, Modifying Factors, Severity.)    Marcellus Pelayo is a 43 y.o. male who presents dizziness and generalized weakness. Patient states he has been sick since 4/16/2022. He has had body aches and a sore throat. He has had sinus congestion and a cough. Patient also has had some nausea and intermittent vomiting. He was started on Levaquin 2 days ago. He has had some improvement with his symptoms. He just feels like he has no energy at all. He denies having any chest pain or shortness of breath. He has had the body aches and chills but no documented fever. His sore throat has gotten better now. He denies any abdominal pain. Denies any diarrhea or constipation. He denies any dysuria or hematuria. Patient denies any neck pain. He denies any numbness or weakness of his arms or legs. He denies any change in vision or speech. He denies any dysuria or hematuria. Patient denies any rashes. He has been COVID vaccinated but has not received any booster. He does have high cholesterol as well as hypertension. He is not a smoker and does not have diabetes. There is no significant family history of coronary artery disease. REVIEW OF SYSTEMS    (2-9 systems for level 4, 10 or more for level 5)     Review of Systems signs are reviewed and are negative except was presented in the HPI    [address]    has a past medical history of Anxiety, Chest pain, GERD (gastroesophageal reflux disease), Headache, Hyperlipidemia, and Kidney stone.     SURGICAL HISTORY      has a past surgical history that includes knee surgery (Right); Testicle torsion reduction; fracture surgery (Left, 02/18/2020); Humerus fracture surgery (Left, 2/18/2020); [place] tooth extraction; and Abdomen surgery (Left, 9/10/2020). CURRENTMEDICATIONS       Previous Medications    ALPRAZOLAM (XANAX) 0.25 MG TABLET    Take 0.25 mg by mouth. CHLORTHALIDONE (HYGROTON) 25 MG TABLET    Take 25 mg by mouth daily    CHOLECALCIFEROL (VITAMIN D3) 50 MCG (2000 UT) CAPS    Take 2,000 Units by mouth daily    CITALOPRAM (CELEXA) 10 MG TABLET    Take 10 mg by mouth daily     CYANOCOBALAMIN (VITAMIN B 12 PO)    Take by mouth    FENOFIBRATE 160 MG TABLET    Take 160 mg by mouth daily    GUAIFENESIN 400 MG TABLET    Take 400 mg by mouth 4 times daily as needed for Cough    LEVOFLOXACIN (LEVAQUIN) 500 MG/100ML SOLN    Infuse 500 mg intravenously every 24 hours    MAGNESIUM 30 MG TABLET    Take by mouth    OMEPRAZOLE PO    Take 20 mg by mouth     PRAVASTATIN (PRAVACHOL) 40 MG TABLET    Take 40 mg by mouth       ALLERGIES     has No Known Allergies. FAMILY HISTORY     has no family status information on file. family history is not on file. SOCIAL HISTORY      reports that he quit smoking about 2 years ago. His smoking use included cigarettes. He has a 5.00 pack-year smoking history. He has never used smokeless tobacco. He reports previous alcohol use. He reports current drug use. Frequency: 3.00 times per week. Drug: Marijuana Johann Pare). PHYSICAL EXAM    (up to 7 for level 4, 8 or more for level 5)   INITIAL VITALS:  height is 5' 8\" (1.727 m) and weight is 79.4 kg (175 lb). His oral temperature is 99.4 °F (37.4 °C). His blood pressure is 134/94 (abnormal) and his pulse is 112. His respiration is 22 and oxygen saturation is 99%. Physical Exam  Constitutional:       General: He is in acute distress. Comments: Mild distress secondary to generalized weakness. HENT:      Head: Normocephalic and atraumatic. Right Ear: Tympanic membrane, ear canal and external ear normal.      Left Ear: Tympanic membrane, ear canal and external ear normal.      Nose:      Right Sinus: No maxillary sinus tenderness or frontal sinus tenderness. Left Sinus: No maxillary sinus tenderness or frontal sinus tenderness. Mouth/Throat:      Mouth: Mucous membranes are moist.      Pharynx: Oropharynx is clear. Uvula midline. No pharyngeal swelling, oropharyngeal exudate, posterior oropharyngeal erythema or uvula swelling. Neck:      Meningeal: Brudzinski's sign and Kernig's sign absent. Cardiovascular:      Rate and Rhythm: Regular rhythm. Tachycardia present. Pulses: Normal pulses. Heart sounds: Normal heart sounds. Pulmonary:      Effort: Tachypnea present. No bradypnea, accessory muscle usage, prolonged expiration, respiratory distress or retractions. Breath sounds: Normal breath sounds and air entry. Comments: Has some tachypnea with a respiratory rate of 22. O2 sats 99% on room air. Lungs are clear to auscultation bilateral with no respiratory distress. Abdominal:      Palpations: Abdomen is soft. Tenderness: There is no abdominal tenderness. There is no right CVA tenderness, left CVA tenderness, guarding or rebound. Negative signs include Matthews's sign, Rovsing's sign and McBurney's sign. Musculoskeletal:      Cervical back: Normal range of motion. No pain with movement, spinous process tenderness or muscular tenderness. Normal range of motion. Right lower leg: No edema. Left lower leg: No edema. Lymphadenopathy:      Cervical: No cervical adenopathy. Right cervical: No superficial, deep or posterior cervical adenopathy. Left cervical: No superficial, deep or posterior cervical adenopathy. Skin:     General: Skin is warm. Capillary Refill: Capillary refill takes less than 2 seconds. Findings: No rash. Neurological:      General: No focal deficit present. Mental Status: He is alert. GCS: GCS eye subscore is 4. GCS verbal subscore is 5. GCS motor subscore is 6. Sensory: Sensation is intact. Motor: Motor function is intact. Coordination: Coordination is intact. Gait: Gait is intact. Psychiatric:         Attention and Perception: Attention normal.         Mood and Affect: Mood normal.         Speech: Speech normal.         Behavior: Behavior normal.         Thought Content: Thought content normal.         Cognition and Memory: Cognition normal.         Judgment: Judgment normal.         DIFFERENTIAL DIAGNOSIS/ MDM:     Upper respiratory infection with sinus congestion and cough for 2 weeks. Now generalized weakness. Will test for influenza and COVID. May have electrolyte abnormalities as he has had a poor appetite. Will check electrolytes possible hypokalemia hypomagnesemia. We will hydrate with IV fluids. We will do a cardiac evaluation as he has risk factors with high cholesterol and hypertension for possible coronary artery disease. Chest x-ray for possible pneumonia. DIAGNOSTIC RESULTS     EKG: All EKG's are interpreted by the Emergency Department Physician who either signs or Co-signs this chart in the absence of a cardiologist.      Interpreted by Kerry Trinidad DO     Rhythm: normal sinus   Rate: 93  Axis: normal  Ectopy: none  Conduction: normal  ST Segments: no acute change  T Waves: no acute change  Q Waves: none    Clinical Impression: normal sinus rhythm with no acute changes/normal EKG. No acute infarction/ischemia noted. RADIOLOGY:        Interpretation per the Radiologist below, if available at the time of this note:      XR CHEST (2 VW)    Result Date: 4/30/2022  EXAMINATION: TWO XRAY VIEWS OF THE CHEST 4/30/2022 10:06 am COMPARISON: None.  HISTORY: ORDERING SYSTEM PROVIDED HISTORY: cough, URI, dizziness, weakness TECHNOLOGIST PROVIDED HISTORY: cough, URI, dizziness, weakness Reason for Exam: pt c/o cold symptoms, dizziness FINDINGS: The lungs are without acute focal process. There is no effusion or pneumothorax. The cardiomediastinal silhouette is without acute process. The osseous structures are without acute process. Remote ORIF of the left humerus. No acute process. LABS:  Results for orders placed or performed during the hospital encounter of 04/30/22   Rapid Influenza A/B Antigens    Specimen: Nasopharyngeal   Result Value Ref Range    Flu A Antigen NEGATIVE NEGATIVE    Flu B Antigen NEGATIVE NEGATIVE   COVID-19, Rapid    Specimen: Nasopharyngeal Swab   Result Value Ref Range    Specimen Description . NASOPHARYNGEAL SWAB     SARS-CoV-2, Rapid Not Detected Not Detected   CBC with Auto Differential   Result Value Ref Range    WBC 11.0 3.5 - 11.0 k/uL    RBC 5.10 4.5 - 5.9 m/uL    Hemoglobin 15.8 13.5 - 17.5 g/dL    Hematocrit 44.2 41 - 53 %    MCV 86.5 80 - 100 fL    MCH 30.9 26 - 34 pg    MCHC 35.8 31 - 37 g/dL    RDW 13.2 12.5 - 15.4 %    Platelets 637 837 - 318 k/uL    MPV 9.0 6.0 - 12.0 fL    Seg Neutrophils 82 (H) 36 - 66 %    Lymphocytes 9 (L) 24 - 44 %    Monocytes 8 2 - 11 %    Eosinophils % 1 1 - 4 %    Basophils 0 0 - 2 %    Segs Absolute 9.00 (H) 1.8 - 7.7 k/uL    Absolute Lymph # 1.00 1.0 - 4.8 k/uL    Absolute Mono # 0.90 0.1 - 1.2 k/uL    Absolute Eos # 0.10 0.0 - 0.4 k/uL    Basophils Absolute 0.00 0.0 - 0.2 k/uL   Comprehensive Metabolic Panel w/ Reflex to MG   Result Value Ref Range    Glucose 140 (H) 70 - 99 mg/dL    BUN 22 (H) 6 - 20 mg/dL    CREATININE 1.42 (H) 0.70 - 1.20 mg/dL    Calcium 10.8 (H) 8.6 - 10.4 mg/dL    Sodium 129 (L) 135 - 144 mmol/L    Potassium 3.0 (L) 3.7 - 5.3 mmol/L    Chloride 91 (L) 98 - 107 mmol/L    CO2 19 (L) 20 - 31 mmol/L    Anion Gap 19 (H) 9 - 17 mmol/L    Alkaline Phosphatase 66 40 - 129 U/L    ALT 22 5 - 41 U/L    AST 21 <40 U/L    Total Bilirubin 0.76 0.3 - 1.2 mg/dL    Total Protein 8.4 (H) 6.4 - 8.3 g/dL    Albumin 5.5 (H) 3.5 - 5.2 g/dL Albumin/Globulin Ratio 1.9 1.0 - 2.5    GFR Non- 56 (L) >60 mL/min    GFR African American >60 >60 mL/min    GFR Comment         Troponin   Result Value Ref Range    Troponin, High Sensitivity 9 0 - 22 ng/L   Urinalysis with Reflex to Culture    Specimen: Urine   Result Value Ref Range    Color, UA Yellow Yellow    Turbidity UA Clear Clear    Glucose, Ur NEGATIVE NEGATIVE    Bilirubin Urine NEGATIVE NEGATIVE    Ketones, Urine NEGATIVE NEGATIVE    Specific Gravity, UA 1.025 1.005 - 1.030    Urine Hgb TRACE (A) NEGATIVE    pH, UA 6.5 5.0 - 8.0    Protein, UA 2+ (A) NEGATIVE    Urobilinogen, Urine Normal Normal    Nitrite, Urine NEGATIVE NEGATIVE    Leukocyte Esterase, Urine NEGATIVE NEGATIVE   Microscopic Urinalysis   Result Value Ref Range    -          WBC, UA 0 TO 2 0 - 5 /HPF    RBC, UA 0 TO 2 0 - 2 /HPF    Epithelial Cells UA 0 TO 2 0 - 5 /HPF    Bacteria, UA FEW (A) None    Other Observations UA (A) NOT REQ. Utilizing a urinalysis as the only screening method to exclude a potential uropathogen can be unreliable in many patient populations. Rapid screening tests are less sensitive than culture and if UTI is a clinical possibility, culture should be considered despite a negative urinalysis. Magnesium   Result Value Ref Range    Magnesium 1.7 1.6 - 2.6 mg/dL   EKG 12 Lead   Result Value Ref Range    Ventricular Rate 93 BPM    Atrial Rate 93 BPM    P-R Interval 172 ms    QRS Duration 100 ms    Q-T Interval 358 ms    QTc Calculation (Bazett) 445 ms    P Axis 73 degrees    R Axis 64 degrees    T Axis 42 degrees       Patient is clearly dehydrated with hypokalemia.   Hydrated with normal saline and given p.o. potassium    EMERGENCY DEPARTMENT COURSE:   Vitals:    Vitals:    04/30/22 0907   BP: (!) 134/94   Pulse: 112   Resp: 22   Temp: 99.4 °F (37.4 °C)   TempSrc: Oral   SpO2: 99%   Weight: 79.4 kg (175 lb)   Height: 5' 8\" (1.727 m)     -------------------------  BP: (!) 134/94, Temp: 99.4 °F (37.4 °C), Pulse: 112, Resp: 22      RE-EVALUATION:  11:15 AM EDT patient is feeling better after receiving a liter of normal saline. He does have hypokalemia with a potassium of 3.0. Given 40 mill equivalents of potassium p.o. Will be discharged home with a prescription for potassium. Cardiac labs were normal.  Influenza a and B and COVID were negative. Chest x-ray shows no acute process. BUN and creatinine were elevated consistent with dehydration. He is currently on Levaquin. He will continue taking the Levaquin. CONSULTS:      PROCEDURES:  None    FINAL IMPRESSION      1. Dehydration    2. Hypokalemia          DISPOSITION/PLAN   DISPOSITION        CONDITION ON DISPOSITION:   Improved    PATIENT REFERRED TO:  [address]  [phone]    Call in 2 days        DISCHARGE MEDICATIONS:  New Prescriptions    ONDANSETRON (ZOFRAN ODT) 4 MG DISINTEGRATING TABLET    Take 1 tablet by mouth every 8 hours as needed for Nausea or Vomiting    POTASSIUM CHLORIDE (KLOR-CON M) 20 MEQ EXTENDED RELEASE TABLET    Take 1 tablet by mouth daily for 5 days       (Please note that portions of this note were completed with a voicerecognition program.  Efforts were made to edit the dictations but occasionally words are mis-transcribed. )    Armin Craft DO,, MD, F.A.C.E.P.   Attending Emergency Medicine Physician        Sarahi Meek DO  04/30/22 1777

## 2022-04-30 NOTE — PROGRESS NOTES
Pt instructions given. All questions answered. Pt states he feels better than he has in 2 weeks.  Ambulatory to front door

## 2022-05-02 LAB
EKG ATRIAL RATE: 93 BPM
EKG P AXIS: 73 DEGREES
EKG P-R INTERVAL: 172 MS
EKG Q-T INTERVAL: 358 MS
EKG QRS DURATION: 100 MS
EKG QTC CALCULATION (BAZETT): 445 MS
EKG R AXIS: 64 DEGREES
EKG T AXIS: 42 DEGREES
EKG VENTRICULAR RATE: 93 BPM

## 2022-09-18 ENCOUNTER — APPOINTMENT (OUTPATIENT)
Dept: GENERAL RADIOLOGY | Age: 37
End: 2022-09-18
Payer: MEDICARE

## 2022-09-18 ENCOUNTER — HOSPITAL ENCOUNTER (EMERGENCY)
Age: 37
Discharge: HOME | End: 2022-09-18
Attending: EMERGENCY MEDICINE
Payer: MEDICARE

## 2022-09-18 VITALS
TEMPERATURE: 99.1 F | RESPIRATION RATE: 16 BRPM | SYSTOLIC BLOOD PRESSURE: 136 MMHG | HEART RATE: 78 BPM | HEIGHT: 69 IN | WEIGHT: 184 LBS | BODY MASS INDEX: 27.25 KG/M2 | OXYGEN SATURATION: 100 % | DIASTOLIC BLOOD PRESSURE: 92 MMHG

## 2022-09-18 DIAGNOSIS — R25.1 SHAKINESS: Primary | ICD-10-CM

## 2022-09-18 DIAGNOSIS — R63.0 DECREASED APPETITE: ICD-10-CM

## 2022-09-18 LAB
ABSOLUTE EOS #: 0.1 K/UL (ref 0–0.4)
ABSOLUTE LYMPH #: 1 K/UL (ref 1–4.8)
ABSOLUTE MONO #: 0.5 K/UL (ref 0.1–1.2)
AMORPHOUS: ABNORMAL
ANION GAP SERPL CALCULATED.3IONS-SCNC: 16 MMOL/L (ref 9–17)
BACTERIA: ABNORMAL
BASOPHILS # BLD: 0 % (ref 0–2)
BASOPHILS ABSOLUTE: 0 K/UL (ref 0–0.2)
BILIRUBIN URINE: NEGATIVE
BUN BLDV-MCNC: 13 MG/DL (ref 6–20)
CALCIUM SERPL-MCNC: 9.9 MG/DL (ref 8.6–10.4)
CHLORIDE BLD-SCNC: 104 MMOL/L (ref 98–107)
CO2: 22 MMOL/L (ref 20–31)
COLOR: YELLOW
CREAT SERPL-MCNC: 1.24 MG/DL (ref 0.7–1.2)
EOSINOPHILS RELATIVE PERCENT: 1 % (ref 1–4)
EPITHELIAL CELLS UA: ABNORMAL /HPF (ref 0–5)
GFR AFRICAN AMERICAN: >60 ML/MIN
GFR NON-AFRICAN AMERICAN: >60 ML/MIN
GFR SERPL CREATININE-BSD FRML MDRD: ABNORMAL ML/MIN/{1.73_M2}
GLUCOSE BLD-MCNC: 95 MG/DL (ref 70–99)
GLUCOSE URINE: NEGATIVE
HCT VFR BLD CALC: 38.2 % (ref 41–53)
HEMOGLOBIN: 13.4 G/DL (ref 13.5–17.5)
KETONES, URINE: NEGATIVE
LEUKOCYTE ESTERASE, URINE: NEGATIVE
LYMPHOCYTES # BLD: 18 % (ref 24–44)
MCH RBC QN AUTO: 31 PG (ref 26–34)
MCHC RBC AUTO-ENTMCNC: 35.1 G/DL (ref 31–37)
MCV RBC AUTO: 88.3 FL (ref 80–100)
MONOCYTES # BLD: 9 % (ref 2–11)
MUCUS: ABNORMAL
NITRITE, URINE: NEGATIVE
OTHER OBSERVATIONS UA: ABNORMAL
PDW BLD-RTO: 13 % (ref 12.5–15.4)
PH UA: 7 (ref 5–8)
PLATELET # BLD: 273 K/UL (ref 140–450)
PMV BLD AUTO: 8.5 FL (ref 6–12)
POTASSIUM SERPL-SCNC: 3.7 MMOL/L (ref 3.7–5.3)
PROTEIN UA: NEGATIVE
RBC # BLD: 4.33 M/UL (ref 4.5–5.9)
RBC UA: ABNORMAL /HPF (ref 0–2)
SARS-COV-2, RAPID: NOT DETECTED
SEG NEUTROPHILS: 72 % (ref 36–66)
SEGMENTED NEUTROPHILS ABSOLUTE COUNT: 4.3 K/UL (ref 1.8–7.7)
SODIUM BLD-SCNC: 142 MMOL/L (ref 135–144)
SPECIFIC GRAVITY UA: 1.01 (ref 1–1.03)
SPECIMEN DESCRIPTION: NORMAL
TURBIDITY: ABNORMAL
URINE HGB: NEGATIVE
UROBILINOGEN, URINE: NORMAL
WBC # BLD: 5.9 K/UL (ref 3.5–11)
WBC UA: ABNORMAL /HPF (ref 0–5)

## 2022-09-18 PROCEDURE — 99284 EMERGENCY DEPT VISIT MOD MDM: CPT

## 2022-09-18 PROCEDURE — 81001 URINALYSIS AUTO W/SCOPE: CPT

## 2022-09-18 PROCEDURE — 36415 COLL VENOUS BLD VENIPUNCTURE: CPT

## 2022-09-18 PROCEDURE — 80048 BASIC METABOLIC PNL TOTAL CA: CPT

## 2022-09-18 PROCEDURE — 87635 SARS-COV-2 COVID-19 AMP PRB: CPT

## 2022-09-18 PROCEDURE — 85025 COMPLETE CBC W/AUTO DIFF WBC: CPT

## 2022-09-18 PROCEDURE — 71045 X-RAY EXAM CHEST 1 VIEW: CPT

## 2022-09-18 RX ORDER — LISINOPRIL 10 MG/1
10 TABLET ORAL DAILY
COMMUNITY

## 2022-09-18 RX ORDER — TAMSULOSIN HYDROCHLORIDE 0.4 MG/1
0.4 CAPSULE ORAL DAILY PRN
COMMUNITY

## 2022-09-18 ASSESSMENT — PAIN - FUNCTIONAL ASSESSMENT: PAIN_FUNCTIONAL_ASSESSMENT: 0-10

## 2022-09-18 ASSESSMENT — PAIN SCALES - GENERAL: PAINLEVEL_OUTOF10: 2

## 2022-09-18 NOTE — DISCHARGE INSTRUCTIONS
Do your best to eat and drink as you need to stay hydrated. Follow-up with your PCP and urology. PLEASE RETURN TO THE EMERGENCY DEPARTMENT IMMEDIATELY if your symptoms worsen in anyway or in 8-12 hours if not improved for re-evaluation. You should immediately return to the ER for symptoms such as increasing pain, bloody stool, fever, a feeling of passing out, light headed, dizziness, chest pain, shortness of breath, persistent nausea and/or vomiting, numbness or weakness to the arms or legs, coolness or color change of the arms or legs. Please understand that at this time there is no evidence for a more serious underlying process, but that early in the process of an illness or injury, an emergency department workup can be falsely reassuring. You should contact your family doctor within the next 24 hours for a follow up appointment    Yang Terrazas!!!    From [address] and [facility] Emergency Services    On behalf of the Emergency Department staff at [address], I would like to thank you for giving us the opportunity to address your health care needs and concerns. We hope that during your visit, our service was delivered in a professional and caring manner. Please keep [address] in mind as we walk with you down the path to your own personal wellness. Please expect an automated text message or email from us so we can ask a few questions about your health and progress. Based on your answers, a clinician may call you back to offer help and instructions. Please understand that early in the process of an illness or injury, an emergency department workup can be falsely reassuring. If you notice any worsening, changing or persistent symptoms please call your family doctor or return to the ER immediately. Tell us how we did during your visit at [email or web address]   and let us know about your experience

## 2022-09-18 NOTE — ED PROVIDER NOTES
[address] [facility] ED  eMERGENCY dEPARTMENT eNCOUnter   Independent Attestation     Pt Name: Ngoc Perkins  MRN: [id number]  [place] 1985  Date of evaluation: 9/18/22       Ngoc Perkins is a 42 y.o. male who presents with Dehydration, Shaking, Fatigue, and Emesis        Based on the medical record, the care appears appropriate. I was personally available for consultation in the Emergency Department.     Esme Sierra MD  Attending Emergency  Physician                Esme Sierra MD  09/18/22 6979

## 2022-09-19 NOTE — ED PROVIDER NOTES
[id number] [facility] ED  [address] [facility] [address]  Phone: [phone]  Fax: [phone]        Pt Name: Waylon Jama  MRN: [id number]  [place] 1985  Date of evaluation: 9/19/22    [address]       Chief Complaint   Patient presents with    Dehydration    Shaking    Fatigue    Emesis       HISTORY OF PRESENT ILLNESS (Location/Symptom, Timing/Onset, Context/Setting, Quality, Duration, Modifying Factors, Severity)      Waylon Jama is a 42 y.o. male with pertinent PMH of anxiety, GERD, hyperlipidemia who presents to the ED via private auto with multiple complaints. Patient presents to the emergency department for this essentially generalized feeling of intermittent shakiness and concern for dehydration. She reports that since Monday he has been experiencing intermittent shakiness. He he did have 1 episode of vomiting and some coughs. Denies chest pain or difficulty breathing. He was concerned that he was dehydrated because he has also had a lack of appetite. He also noted that he thought maybe is associated with a kidney stone as he has had 1 before but does not have any pain in his flanks or urinary symptoms. No abdominal pain either. He did contact his urologist and they started him on some Flomax just in case but did not obtain any labs or imaging. Patient reports that his symptoms have not changed with the Flomax and he realized that he does have any pain like when he did with his kidney stone. He does mention that he ran into an ex-girlfriend and they had a very long lunch and dinner in conversation. Notes that this was a pretty stressful day. Announce that he does not really do anything outside of taking care of his parents and does not like taking time for himself. Does note that he is probably little depressed. He does have a history of anxiety but is not currently medicated for this. No cardiac history. Denies any exacerbating relieving factors. Denies taking any medication for his symptoms. Denies any fever, chills, URI complaints, vision changes, headache, flank pain, abdominal pain, dysuria, hematuria, urinary frequency urgency, diarrhea, constipation, or any other concerns at this time. PAST MEDICAL / SURGICAL / SOCIAL / FAMILY HISTORY     PMH:  has a past medical history of Anxiety, Chest pain, GERD (gastroesophageal reflux disease), Headache, Hyperlipidemia, and Kidney stone. Surgical History:  has a past surgical history that includes knee surgery (Right); Testicle torsion reduction; fracture surgery (Left, 02/18/2020); Humerus fracture surgery (Left, 2/18/2020); [place] tooth extraction; and Abdomen surgery (Left, 9/10/2020). Social History:  reports that he quit smoking about 2 years ago. His smoking use included cigarettes. He has a 5.00 pack-year smoking history. He has never used smokeless tobacco. He reports that he does not currently use alcohol. He reports that he does not currently use drugs. Family History: has no family status information on file. family history is not on file. Psychiatric History: None    Allergies: Adhesive tape and Paxil [paroxetine]    Home Medications:   Prior to Admission medications    Medication Sig Start Date End Date Taking? Authorizing Provider   lisinopril (PRINIVIL;ZESTRIL) 10 MG tablet Take 10 mg by mouth daily   Yes Historical Provider, MD   tamsulosin (FLOMAX) 0.4 MG capsule Take 0.4 mg by mouth daily as needed   Yes Historical Provider, MD   fenofibrate 160 MG tablet Take 160 mg by mouth daily    Historical Provider, MD   Cholecalciferol (VITAMIN D3) 50 MCG (2000 UT) CAPS Take 2,000 Units by mouth daily    Historical Provider, MD   ALPRAZolam (XANAX) 0.25 MG tablet Take 0.25 mg by mouth.     Historical Provider, MD   citalopram (CELEXA) 10 MG tablet Take 10 mg by mouth daily  7/15/17   Historical Provider, MD   pravastatin (PRAVACHOL) 40 MG tablet Take 40 mg by mouth 7/17/17   Historical Provider, MD   OMEPRAZOLE PO Take 20 mg by mouth     Historical Provider, MD   Cyanocobalamin (VITAMIN B 12 PO) Take 1,000 Units by mouth daily    Historical Provider, MD   magnesium 30 MG tablet Take 500 mg by mouth    Historical Provider, MD       REVIEW OF SYSTEMS  (2-9 systems for level 4, 10 ormore for level 5)      Review of Systems    Constitutional: Denies fever or chills. Eyes: Denies vision changes. HENT: Denies sore throat or neck pain. Respiratory:  See HPI. Cardiovascular:  See HPI. GI:  See HPI. : Denies painful urination. Musculoskeletal: Denies recent trauma. Skin: Denies new rashes or wounds. Neurologic:   See HPI. Psychiatric: Denies agitation. Heme: Denies bleeding disorders. All other systems negative except as marked. PHYSICAL EXAM  (up to 7 for level 4, 8 or more for level 5)      INITIAL VITALS:  height is 5' 9\" (1.753 m) and weight is 83.5 kg (184 lb). His oral temperature is 99.1 °F (37.3 °C). His blood pressure is 136/92 (abnormal) and his pulse is 78. His respiration is 16 and oxygen saturation is 100%. Vital signs reviewed. Physical Exam    General:  Alert, cooperative, well-groomed, well-nourished, appears stated age, and is in no acute distress. Head:  Normocephalic, atraumatic, and without obvious abnormality. Eyes:  Sclerae/conjunctivae clear without injection, pallor, or icterus. Corneas clear without opacities. EOM's intact. ENT: Ears and nose are all without obvious masses lesion or deformity. Neck: Supple and symmetrical. Trachea midline. No adenopathy. No jugular venous distention. Lungs:   No respiratory distress. Clear to auscultation bilaterally. No wheezes, rhonchi, or rales. Heart:  Regular rate. Regular rhythm. No murmurs, rubs, or gallops. Abdomen:   Normoactive bowel sounds. Soft, nontender, nondistended without guarding or rebound. No palpable masses. No CVA tenderness. Extremities: Warm and dry without erythema or edema. Skin: Soft, good turgor, and well-hydrated. No obvious rashes or lesions. Neurologic: GCS is 15 and no focal deficits are appreciated. Normal gait. Grossly normal motor and sensation. Speech clear. Psychiatric: Normal mood and affect. Normal behavior. Coherent thought process. DIFFERENTIAL DIAGNOSIS / MDM     Patient presents emerged part of complaint as described above. Vital signs are unremarkable. Physical exam demonstrates a well-appearing nontoxic male in no acute distress. Lungs are clear to auscultation. Heart rate and rhythm are regular. Abdomen soft nontender. No peripheral edema. I have low suspicion for kidney stone. After very lengthy conversation with the patient, this is likely secondary to his recent stressors in his life and is likely to be related to anxiety/depression. We elected to do some basic labs to check electrolytes, urine, chest x-ray, and a COVID swab  And he is agreeable with this plan. PLAN (LABS / IMAGING / EKG):  Orders Placed This Encounter   Procedures    COVID-19, Rapid    XR CHEST PORTABLE    Urinalysis with Reflex to Culture    CBC with Auto Differential    Basic Metabolic Panel w/ Reflex to MG    Microscopic Urinalysis       MEDICATIONS ORDERED:  No orders of the defined types were placed in this encounter. Controlled Substances Monitoring:     DIAGNOSTIC RESULTS     RADIOLOGY: All images are read by the radiologist and their interpretations are reviewed. XR CHEST PORTABLE    Result Date: 9/18/2022  EXAMINATION: ONE XRAY VIEW OF THE CHEST 9/18/2022 11:53 am COMPARISON: AP chest from 04/30/2022 HISTORY: ORDERING SYSTEM PROVIDED HISTORY: decreased appetite TECHNOLOGIST PROVIDED HISTORY: decreased appetite Reason for Exam: Pt has bilat lower chest pain recently, some fatigue and emesis History of GERD FINDINGS: The lungs are without acute focal process. There is no effusion or pneumothorax. The cardiomediastinal silhouette is without acute process.  The osseous structures are without acute process. Previous ORIF proximal left humerus. No acute process. LABS:  Results for orders placed or performed during the hospital encounter of 09/18/22   COVID-19, Rapid    Specimen: Nasopharyngeal Swab   Result Value Ref Range    Specimen Description . NASOPHARYNGEAL SWAB     SARS-CoV-2, Rapid Not Detected Not Detected   Urinalysis with Reflex to Culture    Specimen: Urine   Result Value Ref Range    Color, UA Yellow Yellow    Turbidity UA SLIGHTLY CLOUDY (A) Clear    Glucose, Ur NEGATIVE NEGATIVE    Bilirubin Urine NEGATIVE NEGATIVE    Ketones, Urine NEGATIVE NEGATIVE    Specific Gravity, UA 1.015 1.005 - 1.030    Urine Hgb NEGATIVE NEGATIVE    pH, UA 7.0 5.0 - 8.0    Protein, UA NEGATIVE NEGATIVE    Urobilinogen, Urine Normal Normal    Nitrite, Urine NEGATIVE NEGATIVE    Leukocyte Esterase, Urine NEGATIVE NEGATIVE   CBC with Auto Differential   Result Value Ref Range    WBC 5.9 3.5 - 11.0 k/uL    RBC 4.33 (L) 4.5 - 5.9 m/uL    Hemoglobin 13.4 (L) 13.5 - 17.5 g/dL    Hematocrit 38.2 (L) 41 - 53 %    MCV 88.3 80 - 100 fL    MCH 31.0 26 - 34 pg    MCHC 35.1 31 - 37 g/dL    RDW 13.0 12.5 - 15.4 %    Platelets 339 207 - 643 k/uL    MPV 8.5 6.0 - 12.0 fL    Seg Neutrophils 72 (H) 36 - 66 %    Lymphocytes 18 (L) 24 - 44 %    Monocytes 9 2 - 11 %    Eosinophils % 1 1 - 4 %    Basophils 0 0 - 2 %    Segs Absolute 4.30 1.8 - 7.7 k/uL    Absolute Lymph # 1.00 1.0 - 4.8 k/uL    Absolute Mono # 0.50 0.1 - 1.2 k/uL    Absolute Eos # 0.10 0.0 - 0.4 k/uL    Basophils Absolute 0.00 0.0 - 0.2 k/uL   Basic Metabolic Panel w/ Reflex to MG   Result Value Ref Range    Glucose 95 70 - 99 mg/dL    BUN 13 6 - 20 mg/dL    Creatinine 1.24 (H) 0.70 - 1.20 mg/dL    Calcium 9.9 8.6 - 10.4 mg/dL    Sodium 142 135 - 144 mmol/L    Potassium 3.7 3.7 - 5.3 mmol/L    Chloride 104 98 - 107 mmol/L    CO2 22 20 - 31 mmol/L    Anion Gap 16 9 - 17 mmol/L    GFR Non-African American >60 >60 mL/min    GFR  >60 >60 mL/min    GFR Comment         Microscopic Urinalysis   Result Value Ref Range    WBC, UA 0 TO 2 0 - 5 /HPF    RBC, UA 0 TO 2 0 - 2 /HPF    Epithelial Cells UA 0 TO 2 0 - 5 /HPF    Bacteria, UA FEW (A) None    Mucus, UA 1+ (A) None    Amorphous, UA 2+ (A) None    Other Observations UA (A) NOT REQ. Utilizing a urinalysis as the only screening method to exclude a potential uropathogen can be unreliable in many patient populations. Rapid screening tests are less sensitive than culture and if UTI is a clinical possibility, culture should be considered despite a negative urinalysis. Renee [facility] COURSE     ED Course as of 09/28/22 1336   Sun Sep 18, 2022   1230 Chest x-ray, UA, and COVID swab are unremarkable. [MG]   1325 CBC demonstrates some very mild anemia technically at 13.4 but is otherwise unremarkable. [MG]   1330 Creatinine is elevated at 1.24 but is consistent with his previous. [MG]      ED Course User Index  [MG] Gilma Perdomo PA-C        Vitals:    Vitals:    09/18/22 1116   BP: (!) 136/92   Pulse: 78   Resp: 16   Temp: 99.1 °F (37.3 °C)   TempSrc: Oral   SpO2: 100%   Weight: 83.5 kg (184 lb)   Height: 5' 9\" (1.753 m)     -------------------------  BP: (!) 136/92, Temp: 99.1 °F (37.3 °C), Heart Rate: 78, Resp: 16      RE-EVALUATION:  See ED Course notes above. Patient updated regarding results. I have low suspicion for emergent condition at this time. Advised patient to follow-up with his PCP and possibly seek psychiatric care. The patient and/or family and I have discussed the diagnosis and risks, and we agree with discharging home to follow-up with their pertinent providers. The patient appears stable for discharge and has been instructed to return immediately for new concerning symptoms or if the symptoms worsen in any way.  The patient understands that at this time there is no evidence for a more malignant underlying process, but the patient also understands that early in the process of an illness or injury, an emergency department workup can be falsely reassuring. Routine discharge counseling was given, and the patient understands that worsening, changing or persistent symptoms should prompt an immediate call or follow up with their primary physician or return to the emergency department. I have reviewed the disposition diagnosis with the patient and or their family/guardian. I have answered their questions and given discharge instructions. They voiced understanding of these instructions and did not have any further questions or complaints. The collaborating physician was available for consultation and they were apprised of the assessment and plan. CONSULTS:  None    PROCEDURES:  None    FINAL IMPRESSION      1. Shakiness    2. Decreased appetite          DISPOSITION / PLAN     CONDITION ON DISPOSITION:   Good / Stable for discharge.      PATIENT REFERRED TO:  Trina Samaniego, [address] [place] [address]  [phone]    Call in 2 days        DISCHARGE MEDICATIONS:  Discharge Medication List as of 9/18/2022  1:33 PM          Clarisa Mendez   Emergency Medicine Physician Assistant    (Please note that portions of this note were completed with a voice recognition program.  Efforts were made to edit the dictations but occasionally words aremis-transcribed.)        Kaleigh Ta PA-C  09/28/22 [id number]

## 2023-10-08 ENCOUNTER — APPOINTMENT (OUTPATIENT)
Dept: GENERAL RADIOLOGY | Age: 38
End: 2023-10-08
Payer: MEDICAID

## 2023-10-08 ENCOUNTER — HOSPITAL ENCOUNTER (EMERGENCY)
Age: 38
Discharge: HOME | End: 2023-10-08
Attending: EMERGENCY MEDICINE
Payer: MEDICAID

## 2023-10-08 VITALS
HEIGHT: 68 IN | OXYGEN SATURATION: 99 % | HEART RATE: 68 BPM | SYSTOLIC BLOOD PRESSURE: 151 MMHG | BODY MASS INDEX: 28.19 KG/M2 | DIASTOLIC BLOOD PRESSURE: 92 MMHG | WEIGHT: 186 LBS | TEMPERATURE: 98.9 F | RESPIRATION RATE: 12 BRPM

## 2023-10-08 DIAGNOSIS — E86.0 DEHYDRATION: Primary | ICD-10-CM

## 2023-10-08 LAB
ALBUMIN SERPL-MCNC: 5 G/DL (ref 3.5–5.2)
ALBUMIN/GLOB SERPL: 1.9 {RATIO} (ref 1–2.5)
ALP SERPL-CCNC: 65 U/L (ref 40–129)
ALT SERPL-CCNC: 16 U/L (ref 5–41)
AMORPH SED URNS QL MICRO: ABNORMAL
ANION GAP SERPL CALCULATED.3IONS-SCNC: 13 MMOL/L (ref 9–17)
AST SERPL-CCNC: 20 U/L
BASOPHILS # BLD: 0 K/UL (ref 0–0.2)
BASOPHILS NFR BLD: 0 % (ref 0–2)
BILIRUB SERPL-MCNC: 0.6 MG/DL (ref 0.3–1.2)
BILIRUB UR QL STRIP: NEGATIVE
BUN SERPL-MCNC: 15 MG/DL (ref 6–20)
CALCIUM SERPL-MCNC: 10 MG/DL (ref 8.6–10.4)
CHARACTER UR: ABNORMAL
CHLORIDE SERPL-SCNC: 106 MMOL/L (ref 98–107)
CLARITY UR: ABNORMAL
CO2 SERPL-SCNC: 22 MMOL/L (ref 20–31)
COLOR UR: YELLOW
CREAT SERPL-MCNC: 1.2 MG/DL (ref 0.7–1.2)
CRYSTALS URNS MICRO: ABNORMAL /HPF
EOSINOPHIL # BLD: 0 K/UL (ref 0–0.4)
EOSINOPHILS RELATIVE PERCENT: 1 % (ref 1–4)
EPI CELLS #/AREA URNS HPF: ABNORMAL /HPF (ref 0–5)
ERYTHROCYTE [DISTWIDTH] IN BLOOD BY AUTOMATED COUNT: 13.2 % (ref 12.5–15.4)
GFR SERPL CREATININE-BSD FRML MDRD: >60 ML/MIN/1.73M2
GLUCOSE SERPL-MCNC: 113 MG/DL (ref 70–99)
GLUCOSE UR STRIP-MCNC: NEGATIVE MG/DL
HCT VFR BLD AUTO: 37.8 % (ref 41–53)
HGB BLD-MCNC: 13.3 G/DL (ref 13.5–17.5)
HGB UR QL STRIP.AUTO: NEGATIVE
KETONES UR STRIP-MCNC: NEGATIVE MG/DL
LEUKOCYTE ESTERASE UR QL STRIP: NEGATIVE
LYMPHOCYTES NFR BLD: 0.6 K/UL (ref 1–4.8)
LYMPHOCYTES RELATIVE PERCENT: 14 % (ref 24–44)
MCH RBC QN AUTO: 31 PG (ref 26–34)
MCHC RBC AUTO-ENTMCNC: 35.1 G/DL (ref 31–37)
MCV RBC AUTO: 88.4 FL (ref 80–100)
MONOCYTES NFR BLD: 0.3 K/UL (ref 0.1–1.2)
MONOCYTES NFR BLD: 7 % (ref 2–11)
MUCOUS THREADS URNS QL MICRO: ABNORMAL
NEUTROPHILS NFR BLD: 78 % (ref 36–66)
NEUTS SEG NFR BLD: 3.4 K/UL (ref 1.8–7.7)
NITRITE UR QL STRIP: NEGATIVE
PH UR STRIP: 7 [PH] (ref 5–8)
PLATELET # BLD AUTO: 250 K/UL (ref 140–450)
PMV BLD AUTO: 8.6 FL (ref 6–12)
POTASSIUM SERPL-SCNC: 3.7 MMOL/L (ref 3.7–5.3)
PROT SERPL-MCNC: 7.6 G/DL (ref 6.4–8.3)
PROT UR STRIP-MCNC: ABNORMAL MG/DL
RBC # BLD AUTO: 4.27 M/UL (ref 4.5–5.9)
RBC #/AREA URNS HPF: ABNORMAL /HPF (ref 0–2)
SARS-COV-2 RDRP RESP QL NAA+PROBE: NOT DETECTED
SODIUM SERPL-SCNC: 141 MMOL/L (ref 135–144)
SP GR UR STRIP: 1.02 (ref 1–1.03)
SPECIMEN DESCRIPTION: NORMAL
UROBILINOGEN UR STRIP-ACNC: NORMAL EU/DL (ref 0–1)
WBC #/AREA URNS HPF: ABNORMAL /HPF (ref 0–5)
WBC OTHER # BLD: 4.4 K/UL (ref 3.5–11)

## 2023-10-08 PROCEDURE — 36415 COLL VENOUS BLD VENIPUNCTURE: CPT

## 2023-10-08 PROCEDURE — 96360 HYDRATION IV INFUSION INIT: CPT

## 2023-10-08 PROCEDURE — 71046 X-RAY EXAM CHEST 2 VIEWS: CPT

## 2023-10-08 PROCEDURE — 2580000003 HC RX 258: Performed by: REGISTERED NURSE

## 2023-10-08 PROCEDURE — 85025 COMPLETE CBC W/AUTO DIFF WBC: CPT

## 2023-10-08 PROCEDURE — 81001 URINALYSIS AUTO W/SCOPE: CPT

## 2023-10-08 PROCEDURE — 87635 SARS-COV-2 COVID-19 AMP PRB: CPT

## 2023-10-08 PROCEDURE — 80053 COMPREHEN METABOLIC PANEL: CPT

## 2023-10-08 PROCEDURE — 99284 EMERGENCY DEPT VISIT MOD MDM: CPT

## 2023-10-08 RX ORDER — TIZANIDINE 2 MG/1
2 TABLET ORAL
COMMUNITY
Start: 2021-03-17

## 2023-10-08 RX ORDER — 0.9 % SODIUM CHLORIDE 0.9 %
1000 INTRAVENOUS SOLUTION INTRAVENOUS ONCE
Status: COMPLETED | OUTPATIENT
Start: 2023-10-08 | End: 2023-10-08

## 2023-10-08 RX ADMIN — SODIUM CHLORIDE 1000 ML: 9 INJECTION, SOLUTION INTRAVENOUS at 11:03

## 2023-10-08 ASSESSMENT — ENCOUNTER SYMPTOMS
DIARRHEA: 0
COUGH: 0
SHORTNESS OF BREATH: 0
NAUSEA: 0
BACK PAIN: 0
ABDOMINAL PAIN: 0
VOMITING: 0

## 2023-10-08 ASSESSMENT — PAIN - FUNCTIONAL ASSESSMENT: PAIN_FUNCTIONAL_ASSESSMENT: NONE - DENIES PAIN

## 2023-10-08 NOTE — DISCHARGE INSTRUCTIONS
Please understand that at this time there is no evidence for a more serious underlying process, but that early in the process of an illness or injury, an emergency department workup can be falsely reassuring. You should contact your family doctor within the next 48 hours for a follow up appointment    [address]!!!    From [facility] ([facility]) and [facility]    On behalf of the Emergency Department staff at [facility]), I would like to thank you for giving us the opportunity to address your health care needs and concerns. We hope that during your visit, our service was delivered in a professional and caring manner. Please keep [facility] ([facility]) in mind as we walk with you down the path to your own personal wellness. Please expect an automated text message or email from us so we can ask a few questions about your health and progress. Based on your answers, a clinician may call you back to offer help and instructions. Please understand that early in the process of an illness or injury, an emergency department workup can be falsely reassuring. If you notice any worsening, changing or persistent symptoms please call your family doctor or return to the ER immediately. Tell us how we did during your visit at [email or web address]   and let us know about your experience

## (undated) DEVICE — STOCKINETTE,IMPERVIOUS,12X48,STERILE: Brand: MEDLINE

## (undated) DEVICE — GAUZE,SPONGE,FLUFF,6"X6.75",STRL,5/TRAY: Brand: MEDLINE

## (undated) DEVICE — Z DISCONTINUED PER MEDLINE USE 2741943 DRESSING AQUACEL 10 IN ALG W9XL25CM SIL CVR WTRPRF VIR BACT BARR ANTIMIC

## (undated) DEVICE — POUCH INSTR W6.75XL11.5IN FRST 2 PKT ADH FOR ORTH AND

## (undated) DEVICE — MINOR BSIN PK

## (undated) DEVICE — 3M™ IOBAN™ 2 ANTIMICROBIAL INCISE DRAPE 6650EZ: Brand: IOBAN™ 2

## (undated) DEVICE — SYRINGE IRRIG 60ML SFT PLIABLE BLB EZ TO GRP 1 HND USE W/

## (undated) DEVICE — DECANTER BAG 9": Brand: MEDLINE INDUSTRIES, INC.

## (undated) DEVICE — PADDING,UNDERCAST,COTTON, 4"X4YD STERILE: Brand: MEDLINE

## (undated) DEVICE — BLANKET WRM W40.2XL55.9IN IORT LO BODY + MISTRAL AIR

## (undated) DEVICE — INTENDED FOR TISSUE SEPARATION, AND OTHER PROCEDURES THAT REQUIRE A SHARP SURGICAL BLADE TO PUNCTURE OR CUT.: Brand: BARD-PARKER ® CARBON RIB-BACK BLADES

## (undated) DEVICE — BANDAGE COBAN 4 IN COMPR W4INXL5YD FOAM COHESIVE QUIK STK SELF ADH SFT

## (undated) DEVICE — GLOVE SURG SZ 8 L12IN FNGR THK79MIL GRN LTX FREE

## (undated) DEVICE — DRAPE,U/ SHT,SPLIT,PLAS,STERIL: Brand: MEDLINE

## (undated) DEVICE — TOWEL,OR,DSP,ST,BLUE,DLX,XR,4/PK,20PK/CS: Brand: MEDLINE

## (undated) DEVICE — DRAPE,REIN 53X77,STERILE: Brand: MEDLINE

## (undated) DEVICE — Z DISCONTINUED USE 2624853 GLOVE SURG SZ 75 L12IN THK91MIL BRN LTX FREE

## (undated) DEVICE — ADHESIVE SKIN CLSR 0.7ML TOP DERMBND ADV

## (undated) DEVICE — CONTAINER,SPECIMEN,OR STERILE,4OZ: Brand: MEDLINE

## (undated) DEVICE — GLOVE SURG SZ 8 CRM LTX FREE POLYISOPRENE POLYMER BEAD ANTI

## (undated) DEVICE — WAX SURG 2.5GM HEMSTAT BNE BEESWAX PARAFFIN ISO PALMITATE

## (undated) DEVICE — GARMENT,MEDLINE,DVT,INT,CALF,MED, GEN2: Brand: MEDLINE

## (undated) DEVICE — STRIP,CLOSURE,WOUND,MEDI-STRIP,1/2X4: Brand: MEDLINE

## (undated) DEVICE — SUTURE VCRL SZ 3-0 L27IN ABSRB UD L26MM SH 1/2 CIR J416H

## (undated) DEVICE — YANKAUER,FLEXIBLE HANDLE,REGLR CAPACITY: Brand: MEDLINE INDUSTRIES, INC.

## (undated) DEVICE — HYPODERMIC SAFETY NEEDLE: Brand: MAGELLAN

## (undated) DEVICE — SKIN PREP TRAY W/CHG: Brand: MEDLINE INDUSTRIES, INC.

## (undated) DEVICE — CHLORAPREP 26ML ORANGE

## (undated) DEVICE — K WIRE FIX L285MM DIA2.5MM S STL W/ TRCR PNT
Type: IMPLANTABLE DEVICE | Site: ARM | Status: NON-FUNCTIONAL
Removed: 2020-02-18

## (undated) DEVICE — Z DISCONTINUED GLOVE SURG SZ 7.5 L12IN FNGR THK13MIL WHT ISOLEX

## (undated) DEVICE — PROTECTOR EYE PT SELF ADH NS OPT GRD LF

## (undated) DEVICE — SURGICAL PROCEDURE KIT BASIN MAJ SET UP

## (undated) DEVICE — LOOP VES W25MM THK1MM MAXI RED SIL FLD REPELLENT 100 PER

## (undated) DEVICE — GOWN,SIRUS,NON REINFRCD,LARGE,SET IN SL: Brand: MEDLINE

## (undated) DEVICE — BIT DRL L330MM DIA3.2MM CALIB L100MM NONSTERILE 3 FLUT QUIK

## (undated) DEVICE — ROD RMR L650MM DIA2.5MM W/ EXTN BALL TIP

## (undated) DEVICE — SHEET, T, LAPAROTOMY, STERILE: Brand: MEDLINE

## (undated) DEVICE — SUTURE 2-0 STRATAFIX MONOCRYL 45CM CT-1

## (undated) DEVICE — GLOVE SURG SZ 75 CRM LTX FREE POLYISOPRENE POLYMER BEAD ANTI

## (undated) DEVICE — TUBING, SUCTION, 1/4" X 12', STRAIGHT: Brand: MEDLINE

## (undated) DEVICE — PREP-RESISTANT MARKER W/ RULER: Brand: MEDLINE INDUSTRIES, INC.

## (undated) DEVICE — Device

## (undated) DEVICE — Z DISCONTINUED PER MEDLINE DRESSING ALG W9XL10CM SIL CVR WTRPRF VIR BACT BARR ANTIMIC

## (undated) DEVICE — BANDAGE ADH W1XL3IN UNIV PLAS NAT GEN USE STRP

## (undated) DEVICE — STANDARD POROUS TAPE: Brand: KENDALL

## (undated) DEVICE — SHEET, ORTHO, SPLIT, STERILE: Brand: MEDLINE

## (undated) DEVICE — POSITIONER HD W8XH4XL8.5IN RASPBERRY FOAM SLT